# Patient Record
Sex: MALE | Race: WHITE | NOT HISPANIC OR LATINO | URBAN - METROPOLITAN AREA
[De-identification: names, ages, dates, MRNs, and addresses within clinical notes are randomized per-mention and may not be internally consistent; named-entity substitution may affect disease eponyms.]

---

## 2017-03-29 ENCOUNTER — APPOINTMENT (RX ONLY)
Dept: URBAN - METROPOLITAN AREA CLINIC 23 | Facility: CLINIC | Age: 57
Setting detail: DERMATOLOGY
End: 2017-03-29

## 2017-03-29 DIAGNOSIS — L91.8 OTHER HYPERTROPHIC DISORDERS OF THE SKIN: ICD-10-CM

## 2017-03-29 PROBLEM — E78.5 HYPERLIPIDEMIA, UNSPECIFIED: Status: ACTIVE | Noted: 2017-03-29

## 2017-03-29 PROBLEM — D48.5 NEOPLASM OF UNCERTAIN BEHAVIOR OF SKIN: Status: ACTIVE | Noted: 2017-03-29

## 2017-03-29 PROCEDURE — 11301 SHAVE SKIN LESION 0.6-1.0 CM: CPT | Mod: 59

## 2017-03-29 PROCEDURE — ? COUNSELING

## 2017-03-29 PROCEDURE — 11200 RMVL SKIN TAGS UP TO&INC 15: CPT

## 2017-03-29 PROCEDURE — 11201 RMVL SKIN TAGS EA ADDL 10: CPT

## 2017-03-29 PROCEDURE — ? SHAVE REMOVAL

## 2017-03-29 PROCEDURE — ? SKIN TAG REMOVAL

## 2017-03-29 ASSESSMENT — LOCATION DETAILED DESCRIPTION DERM
LOCATION DETAILED: RIGHT ANTERIOR MEDIAL PROXIMAL UPPER ARM
LOCATION DETAILED: LEFT AXILLARY VAULT
LOCATION DETAILED: RIGHT AXILLARY VAULT
LOCATION DETAILED: LEFT ANTERIOR MEDIAL PROXIMAL UPPER ARM
LOCATION DETAILED: RIGHT ANTERIOR PROXIMAL THIGH
LOCATION DETAILED: LEFT POSTERIOR AXILLA
LOCATION DETAILED: RIGHT ANTERIOR AXILLA

## 2017-03-29 ASSESSMENT — LOCATION SIMPLE DESCRIPTION DERM
LOCATION SIMPLE: LEFT UPPER ARM
LOCATION SIMPLE: RIGHT ANTERIOR AXILLA
LOCATION SIMPLE: LEFT AXILLARY VAULT
LOCATION SIMPLE: RIGHT AXILLARY VAULT
LOCATION SIMPLE: LEFT POSTERIOR AXILLA
LOCATION SIMPLE: RIGHT UPPER ARM
LOCATION SIMPLE: RIGHT THIGH

## 2017-03-29 ASSESSMENT — LOCATION ZONE DERM
LOCATION ZONE: AXILLAE
LOCATION ZONE: LEG
LOCATION ZONE: ARM

## 2017-03-29 NOTE — PROCEDURE: SKIN TAG REMOVAL
Include Z78.9 (Other Specified Conditions Influencing Health Status) As An Associated Diagnosis?: No
Detail Level: Detailed
Consent: Written consent obtained and the risks of skin tag removal was reviewed with the patient including but not limited to bleeding, pigmentary change, infection, pain, and remote possibility of scarring.
Anesthesia Volume In Cc: 1
Medical Necessity Clause: This procedure was medically necessary because the lesions that were treated were:rubbed by patient, deodorant
Medical Necessity Information: It is in your best interest to select a reason for this procedure from the list below. All of these items fulfill various CMS LCD requirements except the new and changing color options.
Hemostasis: Aluminum Chloride

## 2017-03-29 NOTE — PROCEDURE: SHAVE REMOVAL
Medical Necessity Information: It is in your best interest to select a reason for this procedure from the list below. All of these items fulfill various CMS LCD requirements except the new and changing color options.
Wound Care: Vaseline
Bill 74593 For Specimen Handling/Conveyance To Laboratory?: no
Size Of Lesion In Cm (Required): 0.9
Anesthesia Volume In Cc: 0.3
Hemostasis: Aluminum Chloride
Notification Instructions: Patient will be notified of biopsy results. However, patient instructed to call the office if not contacted within 2 weeks.
Billing Type: Third-Party Bill
Consent was obtained from the patient. The risks and benefits to therapy were discussed in detail. Specifically, the risks of infection, scarring, bleeding, prolonged wound healing, incomplete removal, allergy to anesthesia, nerve injury and recurrence were addressed. Prior to the procedure, the treatment site was clearly identified and confirmed by the patient. All components of Universal Protocol/PAUSE Rule completed.
Size Of Margin In Cm (Margins Are Not Added To Billing Dimensions): -
Detail Level: Detailed
Accession #: [id number]
X Size Of Lesion In Cm (Optional): 0
Path Notes (To The Dermatopathologist): Please check margins.
Biopsy Method: Dermablade
Post-Care Instructions: I reviewed with the patient in detail post-care instructions. Patient is to keep the biopsy site dry overnight, and then apply Vaseline and bandaid daily until healed. Patient may apply diluted hydrogen peroxide soaks to remove any crusting.
Medical Necessity Clause: This procedure was medically necessary because the lesion that was treated was:
Anesthesia Type: 1% lidocaine with epinephrine

## 2018-02-12 ENCOUNTER — HOSPITAL ENCOUNTER (OUTPATIENT)
Dept: MAMMOGRAPHY | Age: 58
Discharge: HOME | End: 2018-02-12
Attending: INTERNAL MEDICINE
Payer: COMMERCIAL

## 2018-02-12 DIAGNOSIS — N64.4 MASTODYNIA: ICD-10-CM

## 2018-02-12 PROCEDURE — 77066 DX MAMMO INCL CAD BI: CPT

## 2018-02-19 ENCOUNTER — APPOINTMENT (RX ONLY)
Dept: URBAN - METROPOLITAN AREA CLINIC 349 | Facility: CLINIC | Age: 58
Setting detail: DERMATOLOGY
End: 2018-02-19

## 2018-02-19 DIAGNOSIS — I87.2 VENOUS INSUFFICIENCY (CHRONIC) (PERIPHERAL): ICD-10-CM

## 2018-02-19 DIAGNOSIS — L82.0 INFLAMED SEBORRHEIC KERATOSIS: ICD-10-CM

## 2018-02-19 DIAGNOSIS — L82.1 OTHER SEBORRHEIC KERATOSIS: ICD-10-CM

## 2018-02-19 DIAGNOSIS — L81.4 OTHER MELANIN HYPERPIGMENTATION: ICD-10-CM

## 2018-02-19 DIAGNOSIS — D22 MELANOCYTIC NEVI: ICD-10-CM

## 2018-02-19 PROBLEM — I10 ESSENTIAL (PRIMARY) HYPERTENSION: Status: ACTIVE | Noted: 2018-02-19

## 2018-02-19 PROBLEM — I83.12 VARICOSE VEINS OF LEFT LOWER EXTREMITY WITH INFLAMMATION: Status: ACTIVE | Noted: 2018-02-19

## 2018-02-19 PROBLEM — D22.5 MELANOCYTIC NEVI OF TRUNK: Status: ACTIVE | Noted: 2018-02-19

## 2018-02-19 PROBLEM — I83.11 VARICOSE VEINS OF RIGHT LOWER EXTREMITY WITH INFLAMMATION: Status: ACTIVE | Noted: 2018-02-19

## 2018-02-19 PROBLEM — D22.39 MELANOCYTIC NEVI OF OTHER PARTS OF FACE: Status: ACTIVE | Noted: 2018-02-19

## 2018-02-19 PROBLEM — D23.61 OTHER BENIGN NEOPLASM OF SKIN OF RIGHT UPPER LIMB, INCLUDING SHOULDER: Status: ACTIVE | Noted: 2018-02-19

## 2018-02-19 PROBLEM — D22.62 MELANOCYTIC NEVI OF LEFT UPPER LIMB, INCLUDING SHOULDER: Status: ACTIVE | Noted: 2018-02-19

## 2018-02-19 PROCEDURE — ? LIQUID NITROGEN

## 2018-02-19 PROCEDURE — 99202 OFFICE O/P NEW SF 15 MIN: CPT | Mod: 25

## 2018-02-19 PROCEDURE — ? COUNSELING

## 2018-02-19 PROCEDURE — ? OTHER

## 2018-02-19 PROCEDURE — 17110 DESTRUCTION B9 LES UP TO 14: CPT

## 2018-02-19 ASSESSMENT — LOCATION DETAILED DESCRIPTION DERM
LOCATION DETAILED: RIGHT CLAVICULAR SKIN
LOCATION DETAILED: LEFT POSTERIOR SHOULDER
LOCATION DETAILED: RIGHT INFERIOR CENTRAL MALAR CHEEK
LOCATION DETAILED: LEFT CENTRAL MALAR CHEEK
LOCATION DETAILED: RIGHT DISTAL PRETIBIAL REGION
LOCATION DETAILED: LEFT INFERIOR MEDIAL MALAR CHEEK
LOCATION DETAILED: RIGHT PROXIMAL PRETIBIAL REGION
LOCATION DETAILED: RIGHT CENTRAL MALAR CHEEK
LOCATION DETAILED: RIGHT MEDIAL SUPERIOR CHEST
LOCATION DETAILED: RIGHT MEDIAL TRAPEZIAL NECK
LOCATION DETAILED: LEFT ANTERIOR SHOULDER
LOCATION DETAILED: LEFT CLAVICULAR SKIN
LOCATION DETAILED: LEFT SUPERIOR MEDIAL LOWER BACK
LOCATION DETAILED: LEFT DISTAL PRETIBIAL REGION
LOCATION DETAILED: STERNUM
LOCATION DETAILED: LEFT ANTERIOR PROXIMAL UPPER ARM
LOCATION DETAILED: RIGHT ANTERIOR PROXIMAL UPPER ARM
LOCATION DETAILED: LEFT SUPERIOR MEDIAL MIDBACK
LOCATION DETAILED: RIGHT SUPERIOR MEDIAL LOWER BACK
LOCATION DETAILED: LEFT PROXIMAL PRETIBIAL REGION
LOCATION DETAILED: LEFT ANTERIOR PROXIMAL THIGH
LOCATION DETAILED: LEFT MID-UPPER BACK
LOCATION DETAILED: RIGHT MEDIAL UPPER BACK
LOCATION DETAILED: EPIGASTRIC SKIN
LOCATION DETAILED: LEFT DISTAL DORSAL FOREARM

## 2018-02-19 ASSESSMENT — LOCATION ZONE DERM
LOCATION ZONE: TRUNK
LOCATION ZONE: NECK
LOCATION ZONE: ARM
LOCATION ZONE: FACE
LOCATION ZONE: LEG

## 2018-02-19 ASSESSMENT — LOCATION SIMPLE DESCRIPTION DERM
LOCATION SIMPLE: RIGHT UPPER ARM
LOCATION SIMPLE: LEFT UPPER ARM
LOCATION SIMPLE: LEFT LOWER BACK
LOCATION SIMPLE: LEFT FOREARM
LOCATION SIMPLE: POSTERIOR NECK
LOCATION SIMPLE: LEFT CLAVICULAR SKIN
LOCATION SIMPLE: RIGHT LOWER BACK
LOCATION SIMPLE: RIGHT PRETIBIAL REGION
LOCATION SIMPLE: LEFT THIGH
LOCATION SIMPLE: LEFT SHOULDER
LOCATION SIMPLE: RIGHT CLAVICULAR SKIN
LOCATION SIMPLE: LEFT PRETIBIAL REGION
LOCATION SIMPLE: RIGHT UPPER BACK
LOCATION SIMPLE: CHEST
LOCATION SIMPLE: LEFT CHEEK
LOCATION SIMPLE: LEFT UPPER BACK
LOCATION SIMPLE: RIGHT CHEEK
LOCATION SIMPLE: ABDOMEN

## 2018-02-19 NOTE — PROCEDURE: OTHER
Other (Free Text): Patient will continue to follow up with primary care physician
Detail Level: Zone
Note Text (......Xxx Chief Complaint.): This diagnosis correlates with the

## 2018-02-19 NOTE — PROCEDURE: LIQUID NITROGEN
Include Z78.9 (Other Specified Conditions Influencing Health Status) As An Associated Diagnosis?: Yes
Post-Care Instructions: I reviewed with the patient in detail post-care instructions. Patient is to wear sunprotection, and avoid picking at any of the treated lesions. Pt may apply Vaseline to crusted or scabbing areas.
Consent: The patient's consent was obtained including but not limited to risks of crusting, scabbing, blistering, scarring, darker or lighter pigmentary change, recurrence, incomplete removal and infection.
Medical Necessity Clause: This procedure was medically necessary because the lesions that were treated were:
Detail Level: Detailed
Add 52 Modifier (Optional): no
Number Of Freeze-Thaw Cycles: 2 freeze-thaw cycles
Medical Necessity Information: It is in your best interest to select a reason for this procedure from the list below. All of these items fulfill various CMS LCD requirements except the new and changing color options.

## 2019-02-20 ENCOUNTER — APPOINTMENT (RX ONLY)
Dept: URBAN - METROPOLITAN AREA CLINIC 349 | Facility: CLINIC | Age: 59
Setting detail: DERMATOLOGY
End: 2019-02-20

## 2022-08-23 ENCOUNTER — OFFICE VISIT (OUTPATIENT)
Dept: ORTHOPEDIC SURGERY | Age: 62
End: 2022-08-23
Payer: COMMERCIAL

## 2022-08-23 VITALS — HEIGHT: 72 IN | BODY MASS INDEX: 34 KG/M2 | WEIGHT: 251 LBS

## 2022-08-23 DIAGNOSIS — M54.50 LOW BACK PAIN, UNSPECIFIED BACK PAIN LATERALITY, UNSPECIFIED CHRONICITY, UNSPECIFIED WHETHER SCIATICA PRESENT: Primary | ICD-10-CM

## 2022-08-23 DIAGNOSIS — M51.36 DDD (DEGENERATIVE DISC DISEASE), LUMBAR: ICD-10-CM

## 2022-08-23 DIAGNOSIS — M48.062 SPINAL STENOSIS OF LUMBAR REGION WITH NEUROGENIC CLAUDICATION: ICD-10-CM

## 2022-08-23 PROCEDURE — 99204 OFFICE O/P NEW MOD 45 MIN: CPT | Performed by: ORTHOPAEDIC SURGERY

## 2022-08-23 PROCEDURE — 64483 NJX AA&/STRD TFRM EPI L/S 1: CPT | Performed by: PHYSICAL MEDICINE & REHABILITATION

## 2022-08-23 PROCEDURE — 64484 NJX AA&/STRD TFRM EPI L/S EA: CPT | Performed by: PHYSICAL MEDICINE & REHABILITATION

## 2022-08-23 RX ORDER — HYDROCODONE BITARTRATE AND ACETAMINOPHEN 7.5; 325 MG/1; MG/1
1 TABLET ORAL EVERY 6 HOURS PRN
Qty: 28 TABLET | Refills: 0 | Status: SHIPPED | OUTPATIENT
Start: 2022-08-23 | End: 2022-08-30

## 2022-08-23 RX ORDER — CLONIDINE HYDROCHLORIDE 0.3 MG/1
TABLET ORAL
COMMUNITY
Start: 2022-07-18

## 2022-08-23 RX ORDER — TRIAMCINOLONE ACETONIDE 40 MG/ML
80 INJECTION, SUSPENSION INTRA-ARTICULAR; INTRAMUSCULAR ONCE
Status: COMPLETED | OUTPATIENT
Start: 2022-08-23 | End: 2022-08-23

## 2022-08-23 RX ORDER — DAPAGLIFLOZIN 10 MG/1
TABLET, FILM COATED ORAL
COMMUNITY
Start: 2022-07-28

## 2022-08-23 RX ORDER — LISINOPRIL 40 MG/1
TABLET ORAL
COMMUNITY
Start: 2022-06-05

## 2022-08-23 RX ORDER — PREDNISONE 10 MG/1
TABLET ORAL
Qty: 48 TABLET | Refills: 0 | Status: SHIPPED | OUTPATIENT
Start: 2022-08-23

## 2022-08-23 RX ORDER — FENOFIBRATE 54 MG/1
TABLET ORAL
COMMUNITY
Start: 2022-06-19

## 2022-08-23 RX ORDER — HYDRALAZINE HYDROCHLORIDE 50 MG/1
TABLET, FILM COATED ORAL
COMMUNITY
Start: 2022-07-18

## 2022-08-23 RX ORDER — METOPROLOL SUCCINATE 100 MG/1
TABLET, EXTENDED RELEASE ORAL
COMMUNITY
Start: 2022-07-22

## 2022-08-23 RX ADMIN — TRIAMCINOLONE ACETONIDE 80 MG: 40 INJECTION, SUSPENSION INTRA-ARTICULAR; INTRAMUSCULAR at 14:42

## 2022-08-23 NOTE — LETTER
Tori Graham  1960  62 y.o.  [id number]    Diagnosis Code:                              RT                  LT                  BILAT    DDDZ   LBP   L/S NEURITIS   L SPRAIN   SPONDY   L STENOSIS   L DISC   POST MEEHAN   CRONIC PAIN    SCOLI   LIMB PAIN   TH PAIN   SI JOINT   C DDDZ   C STENOSIS   C DISC   BRACH NEUR   NECK PAIN    CTS   COCCYX   OSTEO   COMP FX   HIP BURS   POST FUS   POST NON   [address] PAIN   ARM PAIN    OTHER____________________________________________________________      Radiographic Studies:    CERV/ THORACIC/ LUMBAR MRI       WITH/  W/O  Contrast              _____________________Discogram    CT /Myelogram of _______________                  NCS/EMG  Upper / Lower Extremity________________    MRI of _______________________                     Other______________________    Injections:     ________________________ESI/ SNRB/ FACET                     _______________________Rhizotomies     Authorization to stop blood thinners: _____________________________________________________      Medications:    __________________Sterpred DS                                    ___________________Gabapentin QD/ BID/ TID    __________________Norco/Tramadol   QD / BID / TID    ____________________NSAIDS  QD / BID / TID    __________________Flexeril QD/ BID/ TID                           ____________________Other      Visit Charges:    Recheck 2           Recheck 3               Recheck 4              Recheck 5      INJ ______________________    New PT 2            New PT 3                New PT 4               New PT 5          Pre-op / Post-op      Physical Therapy:    Lumbar  /  Cervical                     ___________________________ (Traction / Ultrasound, Dry Needling)       Referral: Ollis Balsam /  [facility]   /OTHER: __________________________________________      Follow-up with SEL______________________________________________________________      Work Note: _____________________________________________________________________

## 2022-08-23 NOTE — PROGRESS NOTES
Name: Letha Mendoza  YOB: 1960  Gender: male  MRN: [id number]    DATE: 8/23/2022    CC: Lower Back Pain       HPI this is a new visit on patient Cresencio De Leon who is a 82-year-old male complaining of low back and left leg pain that he has had for about 4 weeks that occurred after bending and lifting something. He states there is been no improvement of his pain over time he states the low back pain radiates around into his anterior thigh and goes down his posterior calf. He denies any right leg pain. He does get numbness and tingling in the left dorsal foot area and he says his left foot and ankle are weak make it difficult to walk and is using a walker. He has had 2 prior L5-S1 left-sided disc surgeries in the past and he said he got better after each. Currently the pain is constant and it is made worse by laying down or standing and seems to be improved by sitting. Treatment has consisted of chiropractic care, heat and ice, IM steroid injection and a lot of Advil. His medical history is significant for being a non-insulin-dependent diabetic. He does not have any heart lung liver kidney disease and he is not on blood thinners. RADIOGRAPHS: AP and lateral lumbar x-rays done today 8/23/2022 show a severely degenerated L5-S1 disc otherwise the rest of his spine is relatively normal in appearance although it is hyperlordotic. Lumbar MRI scan dated 8/17/2022 shows severe L5-S1 disc degeneration with left paracentral disc herniation and significant left-sided foraminal stenosis. In addition he has spinal stenosis at L4-5 that is worse than the stenosis at L3-4. At L2-3 level he has a right-sided herniated disc with extruded treated fragment that goes up the back of the L2 body but he has no right leg pain. PE: He is 6 feet 0 inches tall and weighs 251 pounds. He is normal in appearance but obese and is alert and oriented 3 but appears to be in significant pain.   He uses a walker to ambulate. Straight leg raising test and clonus are negative bilaterally. Motor testing shows that he has weakness with left hip flexion and left ankle dorsiflexion. He seems to have normal left knee extension and plantar flexion and muscle testing on the right side is normal throughout. He seems to have normal sensation throughout both lower extremities. Hip knee range of motion is normal without pain. He has good palpable ankle pulses bilaterally but he has notable edema in both shin and feet    CURRENT MEDS:     Current Outpatient Medications:     cloNIDine (CATAPRES) 0.3 MG tablet, TAKE 1 TABLET BY MOUTH TWICE DAILY FOR 90 DAYS, Disp: , Rfl:     FARXIGA 10 MG tablet, TAKE 1 TABLET BY MOUTH ONCE DAILY, Disp: , Rfl:     fenofibrate (TRICOR) 54 MG tablet, TAKE 1 TABLET BY MOUTH ONCE DAILY FOR 90 DAYS, Disp: , Rfl:     hydrALAZINE (APRESOLINE) 50 MG tablet, TAKE 1 TABLET BY MOUTH WITH FOOD THREE TIMES DAILY FOR 90 DAYS, Disp: , Rfl:     lisinopril (PRINIVIL;ZESTRIL) 40 MG tablet, TAKE 1 TABLET BY MOUTH ONCE DAILY, Disp: , Rfl:     metoprolol succinate (TOPROL XL) 100 MG extended release tablet, TAKE 1 TABLET BY MOUTH IN THE MORNING AND 2 IN THE EVENING, Disp: , Rfl:     sertraline (ZOLOFT) 50 MG tablet, TAKE 1 TABLET BY MOUTH ONCE DAILY, Disp: , Rfl:     predniSONE (DELTASONE) 10 MG tablet, Day 1-4: 2 tablets before breakfast, 1 after lunch, 1 after dinner, 2 tablets at bedtime. Day 5-8: 1 tablet before breakfast, 1 after lunch, 1 after dinner, 1 at bedtime. Day 9-12: 1 tablet before breakfast and 1 at bedtime. , Disp: 48 tablet, Rfl: 0    HYDROcodone-acetaminophen (NORCO) 7.5-325 MG per tablet, Take 1 tablet by mouth every 6 hours as needed for Pain for up to 7 days. Intended supply: 7 days.  Take lowest dose possible to manage pain, Disp: 28 tablet, Rfl: 0    POTASSIUM CHLORIDE PO, Take 10 mg by mouth, Disp: , Rfl:     amLODIPine (NORVASC) 10 MG tablet, Take 10 mg by mouth, Disp: , Rfl:     atorvastatin (LIPITOR) 40 MG tablet, Take 40 mg by mouth, Disp: , Rfl:     vitamin D (CHOLECALCIFEROL) 11212 UNIT CAPS, Take by mouth, Disp: , Rfl:     furosemide (LASIX) 20 MG tablet, Take 20 mg by mouth, Disp: , Rfl:     metoprolol tartrate (LOPRESSOR) 25 MG tablet, Take 25 mg by mouth, Disp: , Rfl:    No Known Allergies    ASSESSMENT/PLAN: I talked with the patient and his wife and showed him where I thought the problem was which really think is at L5-S1 and has the stenosis he has at other levels looks longstanding and would not be acute onset after bending lifting injury like a disc herniation. In addition he has the weakness with dorsiflexion which I believe is the L5 nerve root. I talked to the patient his wife and discussed that he is now having a third time disc herniation at L5-S1 with a very collapsed disc and significant foraminal stenosis on the left and I do not think I could reliably just do a laminectomy and open the foramen. I think to really get a good decompression of the L5 foramen he would need an anterior interbody fusion to distract the disc space. He would also need a posterior laminectomy decompression followed by an instrumented fusion tolerating this AP fusion significant surgery and since he does not do manual labor I think he would get a work for the usual 3 months. 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  I told him that we could do left-sided L5 and S1 selective nerve root blocks which hopefully would help him and he is going on a trip this weekend for business someone to give him some Norco 7.5 mg for pain I am also give him a Sterapred Dosepak. We will try to line up the selective nerve root blocks before he leaves at least the first 1 if possible.   Depending on the success of the injections he could either get all 3A or I could see him back after the first would not talk more about surgery told him it would be several for up to 7 days. Intended supply: 7 days. Take lowest dose possible to manage pain             No follow-up provider specified. Electronically signed by Barbie Alcazar MD        Elements of this note were created using speech recognition software. As such, errors of speech recognition may be present.

## 2022-08-23 NOTE — PROGRESS NOTES
Name: Madeleine Daly  YOB: 1960  Gender: male  MRN: [id number]        Transforaminal OPAL Procedure Note    Procedure: Left  L5-S1 and S1-S2 transforaminal epidural steroid injections     Precautions: Madeleine Daly denies prior sensitivity to steroid, local anesthetic, iodine, or shellfish. Consent:  Consent was obtained prior to the procedure. The procedure was discussed at length with Madeleine Daly. He was given the opportunity to ask questions regarding the procedure and its associated risks. In addition to the potential risks associated with the procedure itself, the patient was informed both verbally and in writing of potential side effects of the use glucocorticoids. The patient appeared to comprehend the informed consent and desired to have the procedure performed, and informed consent was signed. He was placed in a prone position on the fluoroscopy table and the skin was prepped and draped in a routine sterile fashion. The areas to be injected were each anesthetized with 1 ml of 1% Lidocaine. A 22 gauge 3.5 inch spinal needle was carefully advanced under fluoroscopic guidance to the left L5-S1 transforaminal space  0.5 ml of 70% of Omnipaque was injected to confirm proper needle placement and absence of subdural or vascular flow Once proper placement was confirmed, 0.5ml of 0.25 marcaine and 60 mg of kenalog were injected through the spinal needle. The above procedure was then repeated at the Left  S1-S2 transforaminal space using 60 mg of kenalog. Fluoroscopic guidance was used intermittently over a 10-minute period to insure proper needle placement and his safety. A hard copy of the fluoroscopic image has been placed in his chart and is saved on the C-arm hard drive. He was monitored for 30 minutes after the procedure and discharged home in a stable fashion with a routine follow up.     Procedural Diagnosis:     ICD-10-CM    1. Low back pain, unspecified back pain laterality, unspecified chronicity, unspecified whether sciatica present  M54.50 FL NERVE BLOCK LUMBOSACRAL 1ST     triamcinolone acetonide (KENALOG-40) injection 80 mg      2. Spinal stenosis of lumbar region with neurogenic claudication  M48.062 FL NERVE BLOCK LUMBOSACRAL 1ST     triamcinolone acetonide (KENALOG-40) injection 80 mg      3.  DDD (degenerative disc disease), lumbar  M51.36 FL NERVE BLOCK LUMBOSACRAL 1ST     triamcinolone acetonide (KENALOG-40) injection 80 mg         Evans Pollack MD  08/23/22 no

## 2022-10-03 ENCOUNTER — OFFICE VISIT (OUTPATIENT)
Dept: UROLOGY | Age: 62
End: 2022-10-03
Payer: COMMERCIAL

## 2022-10-03 DIAGNOSIS — R97.20 PSA ELEVATION: Primary | ICD-10-CM

## 2022-10-03 LAB
BILIRUBIN, URINE, POC: NEGATIVE
BLOOD URINE, POC: NEGATIVE
GLUCOSE URINE, POC: 500
KETONES, URINE, POC: NEGATIVE
LEUKOCYTE ESTERASE, URINE, POC: NEGATIVE
NITRITE, URINE, POC: NEGATIVE
PH, URINE, POC: 6 (ref 4.6–8)
PROTEIN,URINE, POC: 300
SPECIFIC GRAVITY, URINE, POC: 1.03 (ref 1–1.03)
URINALYSIS CLARITY, POC: ABNORMAL
URINALYSIS COLOR, POC: ABNORMAL
UROBILINOGEN, POC: NORMAL

## 2022-10-03 PROCEDURE — 81002 URINALYSIS NONAUTO W/O SCOPE: CPT | Performed by: UROLOGY

## 2022-10-03 PROCEDURE — 99243 OFF/OP CNSLTJ NEW/EST LOW 30: CPT | Performed by: UROLOGY

## 2022-10-03 ASSESSMENT — ENCOUNTER SYMPTOMS
COUGH: 1
EYES NEGATIVE: 1
BACK PAIN: 1

## 2022-10-03 NOTE — PROGRESS NOTES
[facility]  [address]  [address] [facility], [address] [facility]  [phone]    Katia Dash  : 1960    Chief Complaint   Patient presents with    New Patient    Elevated PSA        HPI     Katia Dash is a 63 y.o. male Referred by Dr. Ronaldo Barboza for evaluation and treatment of elevated PSA. PSA 11. 1. he gets the labs done every 3 months. He states that the PSA was around 2 earlier in . Pt's maternal grandfather  of prostate cancer. Walks with a cane because of back problems. Is going to have a spinal fusion. Past Medical History:   Diagnosis Date    Diabetes mellitus ([address]     Elevated PSA 2022    Hypertension      Past Surgical History:   Procedure Laterality Date    BLADDER SURGERY       Current Outpatient Medications   Medication Sig Dispense Refill    cloNIDine (CATAPRES) 0.3 MG tablet TAKE 1 TABLET BY MOUTH TWICE DAILY FOR 90 DAYS      FARXIGA 10 MG tablet TAKE 1 TABLET BY MOUTH ONCE DAILY      fenofibrate (TRICOR) 54 MG tablet TAKE 1 TABLET BY MOUTH ONCE DAILY FOR 90 DAYS      hydrALAZINE (APRESOLINE) 50 MG tablet TAKE 1 TABLET BY MOUTH WITH FOOD THREE TIMES DAILY FOR 90 DAYS      lisinopril (PRINIVIL;ZESTRIL) 40 MG tablet TAKE 1 TABLET BY MOUTH ONCE DAILY      metoprolol succinate (TOPROL XL) 100 MG extended release tablet TAKE 1 TABLET BY MOUTH IN THE MORNING AND 2 IN THE EVENING      sertraline (ZOLOFT) 50 MG tablet TAKE 1 TABLET BY MOUTH ONCE DAILY      predniSONE (DELTASONE) 10 MG tablet Day 1-4: 2 tablets before breakfast, 1 after lunch, 1 after dinner, 2 tablets at bedtime. Day 5-8: 1 tablet before breakfast, 1 after lunch, 1 after dinner, 1 at bedtime. Day 9-12: 1 tablet before breakfast and 1 at bedtime.  48 tablet 0    POTASSIUM CHLORIDE PO Take 10 mg by mouth      amLODIPine (NORVASC) 10 MG tablet Take 10 mg by mouth      atorvastatin (LIPITOR) 40 MG tablet Take 40 mg by mouth      vitamin D (CHOLECALCIFEROL) 14004 UNIT CAPS Take by mouth furosemide (LASIX) 20 MG tablet Take 20 mg by mouth      metoprolol tartrate (LOPRESSOR) 25 MG tablet Take 25 mg by mouth       No current facility-administered medications for this visit. No Known Allergies  Social History     Socioeconomic History    Marital status:      Spouse name: Not on file    Number of children: Not on file    Years of education: Not on file    Highest education level: Not on file   Occupational History    Not on file   Tobacco Use    Smoking status: Some Days     Types: Cigars    Smokeless tobacco: Never    Tobacco comments:     cigars   Substance and Sexual Activity    Alcohol use: Yes     Alcohol/week: 2.0 standard drinks     Types: 1 Glasses of wine, 1 Cans of beer per week    Drug use: Not Currently    Sexual activity: Yes     Partners: Female   Other Topics Concern    Not on file   Social History Narrative    Not on file     Social Determinants of Health     Financial Resource Strain: Not on file   Food Insecurity: Not on file   Transportation Needs: Not on file   Physical Activity: Not on file   Stress: Not on file   Social Connections: Not on file   Intimate Partner Violence: Not on file   Housing Stability: Not on file     Family History   Problem Relation Age of Onset    Hypertension Father             Prostate Cancer Paternal Grandfather                Review of Systems  Constitutional: Negative  Skin: Negative skin ROS  Eyes: Eyes negative  ENT: HENT negative  Respiratory: Positive for cough. Cardiovascular: Positive for hypertension, leg swelling and regular rate and rhythm. GI: Neg GI ROS  Genitourinary: Genitourinary negative  Musculoskeletal: Positive for back pain. Neurological: Neg neuro ROS  Psychological: Neg psych ROS  Endocrine: Endocrine negative  Hem/Lymphatic: Hematologic/lymphatic negative    There were no vitals taken for this visit. Urinalysis  UA - Dipstick  No results found for this or any previous visit.     UA - Micro  WBC - 0  RBC - 0  Bacteria - 0  Epith - 0    Physical Exam    Assessment and Plan    Oleta Severs was seen today for new patient and elevated psa. Diagnoses and all orders for this visit:    PSA elevation  -     AMB POC URINALYSIS DIP STICK MANUAL W/O MICRO  -     PSA, Total and Free; Future  -     PSA, Total and Free   Will recheck PSA II. Was going to see him back in room, but he left . Call patient with results      Follow-up and Dispositions    Return for call patient to arrange follow-up.

## 2022-10-04 LAB
PSA FREE MFR SERPL: 13.2 %
PSA FREE SERPL-MCNC: 1.7 NG/ML
PSA SERPL-MCNC: 12.9 NG/ML

## 2022-10-05 ENCOUNTER — TELEPHONE (OUTPATIENT)
Dept: UROLOGY | Age: 62
End: 2022-10-05

## 2022-10-05 DIAGNOSIS — R97.20 PSA ELEVATION: Primary | ICD-10-CM

## 2022-10-05 NOTE — TELEPHONE ENCOUNTER
Called pt, repeat PSA is 12. Recommended MRI    Diagnosis Orders   1. PSA elevation  MRI PELVIS W WO CONTRAST        Call patient with results  May need MRI fusion biopsy.

## 2022-10-17 ENCOUNTER — HOSPITAL ENCOUNTER (OUTPATIENT)
Dept: MRI IMAGING | Age: 62
Discharge: HOME | End: 2022-10-20
Payer: COMMERCIAL

## 2022-10-17 DIAGNOSIS — R97.20 PSA ELEVATION: ICD-10-CM

## 2022-10-17 PROCEDURE — 2580000003 HC RX 258: Performed by: UROLOGY

## 2022-10-17 PROCEDURE — A9579 GAD-BASE MR CONTRAST NOS,1ML: HCPCS | Performed by: UROLOGY

## 2022-10-17 PROCEDURE — 6360000004 HC RX CONTRAST MEDICATION: Performed by: UROLOGY

## 2022-10-17 PROCEDURE — 72197 MRI PELVIS W/O & W/DYE: CPT

## 2022-10-17 RX ORDER — SODIUM CHLORIDE 0.9 % (FLUSH) 0.9 %
40 SYRINGE (ML) INJECTION AS NEEDED
Status: DISCONTINUED | OUTPATIENT
Start: 2022-10-17 | End: 2022-10-21 | Stop reason: HOSPADM

## 2022-10-17 RX ADMIN — SODIUM CHLORIDE, PRESERVATIVE FREE 40 ML: 5 INJECTION INTRAVENOUS at 21:30

## 2022-10-17 RX ADMIN — GADOTERIDOL 24 ML: 279.3 INJECTION, SOLUTION INTRAVENOUS at 21:30

## 2022-11-04 ENCOUNTER — TELEPHONE (OUTPATIENT)
Dept: UROLOGY | Age: 62
End: 2022-11-04

## 2022-11-04 NOTE — TELEPHONE ENCOUNTER
Called pt about MRI, shows PIRADS 3 area, possible prostatitis. He has just started a Zpack for URI. Discussed options of repeating pSA after abx, or proceeding to biopsy. Make appt with me in 6 weeks with PSA before. Will do GAEL then.

## 2023-02-04 ENCOUNTER — TELEPHONE (OUTPATIENT)
Dept: UROLOGY | Age: 63
End: 2023-02-04

## 2023-02-04 NOTE — TELEPHONE ENCOUNTER
Called to schedule f/u appt with PSA prior. Reached pt's voicemail. LVM for pt to call and make appts.

## 2023-03-22 ENCOUNTER — TELEPHONE (OUTPATIENT)
Dept: UROLOGY | Age: 63
End: 2023-03-22

## 2025-04-01 ENCOUNTER — OFFICE VISIT (OUTPATIENT)
Dept: UROLOGY | Age: 65
End: 2025-04-01
Payer: COMMERCIAL

## 2025-04-01 DIAGNOSIS — R97.20 ELEVATED PSA: Primary | ICD-10-CM

## 2025-04-01 LAB
BILIRUBIN, URINE, POC: NEGATIVE
BLOOD URINE, POC: NEGATIVE
GLUCOSE URINE, POC: 250 MG/DL
KETONES, URINE, POC: NEGATIVE MG/DL
LEUKOCYTE ESTERASE, URINE, POC: NEGATIVE
NITRITE, URINE, POC: NEGATIVE
PH, URINE, POC: 5.5 (ref 4.6–8)
PROTEIN,URINE, POC: 300 MG/DL
SPECIFIC GRAVITY, URINE, POC: 1.02 (ref 1–1.03)
URINALYSIS CLARITY, POC: NORMAL
URINALYSIS COLOR, POC: NORMAL
UROBILINOGEN, POC: NORMAL MG/DL

## 2025-04-01 PROCEDURE — 99203 OFFICE O/P NEW LOW 30 MIN: CPT | Performed by: NURSE PRACTITIONER

## 2025-04-01 PROCEDURE — 81003 URINALYSIS AUTO W/O SCOPE: CPT | Performed by: NURSE PRACTITIONER

## 2025-04-01 RX ORDER — SPIRONOLACTONE 25 MG/1
25 TABLET ORAL 2 TIMES DAILY
COMMUNITY

## 2025-04-01 RX ORDER — CHLORAL HYDRATE 500 MG
1 CAPSULE ORAL
COMMUNITY

## 2025-04-01 RX ORDER — LINACLOTIDE 290 UG/1
CAPSULE, GELATIN COATED ORAL
COMMUNITY

## 2025-04-01 RX ORDER — MELOXICAM 7.5 MG/1
7.5 TABLET ORAL DAILY
COMMUNITY

## 2025-04-01 RX ORDER — GLIMEPIRIDE 2 MG/1
2 TABLET ORAL 2 TIMES DAILY
COMMUNITY

## 2025-04-01 RX ORDER — LABETALOL 200 MG/1
200 TABLET, FILM COATED ORAL
COMMUNITY
Start: 2025-01-22

## 2025-04-01 RX ORDER — EMPAGLIFLOZIN 25 MG/1
25 TABLET, FILM COATED ORAL
COMMUNITY

## 2025-04-01 RX ORDER — SEMAGLUTIDE 0.68 MG/ML
0.5 INJECTION, SOLUTION SUBCUTANEOUS
COMMUNITY

## 2025-04-01 ASSESSMENT — ENCOUNTER SYMPTOMS
NAUSEA: 0
BACK PAIN: 0

## 2025-04-01 NOTE — PROGRESS NOTES
[facility]  [address] [place] [id number]  [phone]          Arvind Rodas  : 1960    Chief Complaint   Patient presents with    Follow-up          HPI     Arvind Rodas is a 64 y.o. male  Referred back for elevated PSA. He was seen Dr. Nixon in the past. Last visit was . At that time PSA was elevated to 11.1. A MRI was done and it showed one PI-RADS-3 lesion.  No other suspicious areas.     Pt's maternal grandfather  of prostate cancer.  Walks with a cane because of back problems. Is going to have a spinal fusion.     Current PSA is 14.6  PSA  was 8.6  PSA  was 11.1  PSA  was 12.9    Pt has not complaint today. He is voiding well.     Past Medical History:   Diagnosis Date    Diabetes mellitus (HCC)     Elevated PSA 2022    Hypertension      Past Surgical History:   Procedure Laterality Date    BLADDER SURGERY       Current Outpatient Medications   Medication Sig Dispense Refill    labetalol (NORMODYNE) 200 MG tablet 1 tablet      Semaglutide,0.25 or 0.5MG/DOS, (OZEMPIC, 0.25 OR 0.5 MG/DOSE,) 2 MG/3ML SOPN Inject 0.5 mg into the skin every 7 days      JARDIANCE 25 MG tablet 1 tablet      glimepiride (AMARYL) 2 MG tablet Take 1 tablet by mouth 2 times daily      linaclotide (LINZESS) 290 MCG CAPS capsule 90      Omega-3 Fatty Acids (FISH OIL) 1000 MG capsule 1 capsule      spironolactone (ALDACTONE) 25 MG tablet Take 1 tablet by mouth 2 times daily      meloxicam (MOBIC) 7.5 MG tablet Take 1 tablet by mouth daily      cloNIDine (CATAPRES) 0.3 MG tablet TAKE 1 TABLET BY MOUTH TWICE DAILY FOR 90 DAYS      fenofibrate (TRICOR) 54 MG tablet TAKE 1 TABLET BY MOUTH ONCE DAILY FOR 90 DAYS      hydrALAZINE (APRESOLINE) 50 MG tablet TAKE 1 TABLET BY MOUTH WITH FOOD THREE TIMES DAILY FOR 90 DAYS      metoprolol succinate (TOPROL XL) 100 MG extended release tablet TAKE 1 TABLET BY MOUTH IN THE MORNING AND 2 IN THE EVENING      amLODIPine (NORVASC) 10 MG

## 2025-04-08 ENCOUNTER — HOSPITAL ENCOUNTER (OUTPATIENT)
Age: 65
Discharge: HOME | End: 2025-04-10
Payer: COMMERCIAL

## 2025-04-08 DIAGNOSIS — R97.20 ELEVATED PSA: ICD-10-CM

## 2025-04-08 PROCEDURE — 6360000004 HC RX CONTRAST MEDICATION: Performed by: NURSE PRACTITIONER

## 2025-04-08 PROCEDURE — A9579 GAD-BASE MR CONTRAST NOS,1ML: HCPCS | Performed by: NURSE PRACTITIONER

## 2025-04-08 PROCEDURE — 72197 MRI PELVIS W/O & W/DYE: CPT

## 2025-04-08 RX ADMIN — GADOTERIDOL 25 ML: 279.3 INJECTION, SOLUTION INTRAVENOUS at 08:55

## 2025-04-11 ENCOUNTER — RESULTS FOLLOW-UP (OUTPATIENT)
Dept: UROLOGY | Age: 65
End: 2025-04-11

## 2025-04-11 DIAGNOSIS — R97.20 ELEVATED PSA: ICD-10-CM

## 2025-04-11 RX ORDER — CIPROFLOXACIN 500 MG/1
TABLET, FILM COATED ORAL
Qty: 6 TABLET | Refills: 0 | Status: SHIPPED | OUTPATIENT
Start: 2025-04-11

## 2025-04-14 ENCOUNTER — TELEPHONE (OUTPATIENT)
Dept: UROLOGY | Age: 65
End: 2025-04-14

## 2025-04-15 NOTE — TELEPHONE ENCOUNTER
I spoke with pt.  I told him our next available date of 5/13.  I just need confirmation from [facility] that they are available that afternoon.  I have emailed them to confirm before scheduling.

## 2025-04-15 NOTE — TELEPHONE ENCOUNTER
----- Message from TON Rosas NP sent at 4/11/2025 12:02 PM EDT -----  I called pt about MRI. He will need MRI guided fusion bx.  He will start antibiotic the day before and take it for 3 days.      Please call pt to arrange bx with Dr. Nixon.

## 2025-04-16 PROBLEM — R97.20 ELEVATED PSA: Status: ACTIVE | Noted: 2025-04-11

## 2025-04-16 NOTE — TELEPHONE ENCOUNTER
Can you put in the surgery orders for this one?  Thanks    PROSTATE BIOPSY FUSION  Date: 5/13/2025  Time: 1305  Location: [facility] OP OR 05

## 2025-04-29 NOTE — PERIOP NOTE
Patient verified name and .  Order for consent  was found in EHR and does match case posting; patient verifies procedure.   Type 1a surgery, phone assessment complete.  Orders  received.  Labs per surgeon: UA, urine culture, CBC with diff s/h PAT- patient will come in for labs- chart flagged for charge RN  Labs per anesthesia protocol: SQBS, POC K+ s/h for DOS    Patient answered medical/surgical history questions at their best of ability. All prior to admission medications documented in EPIC.      Patient instructed on the following:  > labs- [address] [facility], [address], Monday-Friday 8 am- 3:30 pm  > Surgery Location: [address] [place] [address] Outpatient Entrance   > Time of arrival for surgery: A nurse will call you by 5:00 pm the business day before your surgery      to tell you the time you should arrive. Your arrival time may be different than your surgery time. If there is no      answer; the nurse will leave you a voicemail. If you have not received a phone call and do not have a voicemail     by 5:00 pm the day before your surgery please call \[facility]\"" Pre-op: [phone].    > Clear liquid diet the day prior to sugery,  patient may drink clear liquids up until 4 hours prior to arrival. No gum, candy, mints.   > Things included in a clear liquid diet:     Water  Black Coffee (may have sugar but no milk or cream)  Tea  Any type soft drink  Apple, Cranberry, or Grape juice  Chicken bouillon or broth  Beef bouillon or broth  Popsicles  Jell-O (any flavor), NO solid fruit mixed in  Hard candy that is clear, such as lifesavers or lemon drops    Things NOT included in clear liquid:     Milk and milk products  Milkshakes  Any solid food  Any solid soup with solid ingredients such as noodles  Any creamed soup  Gum or Mints  Orange juice (or any other juice containing pulp)        > Responsible adult must drive patient to the hospital, stay during surgery, and patient will need supervision 24 hours

## 2025-05-02 ENCOUNTER — HOSPITAL ENCOUNTER (OUTPATIENT)
Dept: LAB | Age: 65
Discharge: HOME | End: 2025-05-02
Payer: COMMERCIAL

## 2025-05-02 DIAGNOSIS — R97.20 ELEVATED PSA: ICD-10-CM

## 2025-05-02 LAB
APPEARANCE UR: CLEAR
BACTERIA URNS QL MICRO: NEGATIVE /HPF
BASOPHILS # BLD: 0.03 K/UL (ref 0–0.2)
BASOPHILS NFR BLD: 0.4 % (ref 0–2)
BILIRUB UR QL: NEGATIVE
COLOR UR: ABNORMAL
DIFFERENTIAL METHOD BLD: ABNORMAL
EOSINOPHIL # BLD: 0.11 K/UL (ref 0–0.8)
EOSINOPHIL NFR BLD: 1.5 % (ref 0.5–7.8)
EPI CELLS #/AREA URNS HPF: ABNORMAL /HPF
ERYTHROCYTE [DISTWIDTH] IN BLOOD BY AUTOMATED COUNT: 13.7 % (ref 11.9–14.6)
GLUCOSE UR STRIP.AUTO-MCNC: 100 MG/DL
HCT VFR BLD AUTO: 38.9 % (ref 41.1–50.3)
HGB BLD-MCNC: 12.1 G/DL (ref 13.6–17.2)
HGB UR QL STRIP: NEGATIVE
HYALINE CASTS URNS QL MICRO: ABNORMAL /LPF
IMM GRANULOCYTES # BLD AUTO: 0.02 K/UL (ref 0–0.5)
IMM GRANULOCYTES NFR BLD AUTO: 0.3 % (ref 0–5)
KETONES UR QL STRIP.AUTO: NEGATIVE MG/DL
LEUKOCYTE ESTERASE UR QL STRIP.AUTO: NEGATIVE
LYMPHOCYTES # BLD: 0.8 K/UL (ref 0.5–4.6)
LYMPHOCYTES NFR BLD: 10.9 % (ref 13–44)
MCH RBC QN AUTO: 25.5 PG (ref 26.1–32.9)
MCHC RBC AUTO-ENTMCNC: 31.1 G/DL (ref 31.4–35)
MCV RBC AUTO: 82.1 FL (ref 82–102)
MONOCYTES # BLD: 0.48 K/UL (ref 0.1–1.3)
MONOCYTES NFR BLD: 6.6 % (ref 4–12)
NEUTS SEG # BLD: 5.87 K/UL (ref 1.7–8.2)
NEUTS SEG NFR BLD: 80.3 % (ref 43–78)
NITRITE UR QL STRIP.AUTO: NEGATIVE
NRBC # BLD: 0 K/UL (ref 0–0.2)
PH UR STRIP: 6 (ref 5–9)
PLATELET # BLD AUTO: 231 K/UL (ref 150–450)
PMV BLD AUTO: 9.9 FL (ref 9.4–12.3)
PROT UR STRIP-MCNC: 100 MG/DL
RBC # BLD AUTO: 4.74 M/UL (ref 4.23–5.6)
RBC #/AREA URNS HPF: ABNORMAL /HPF
SP GR UR REFRACTOMETRY: 1.01 (ref 1–1.02)
UROBILINOGEN UR QL STRIP.AUTO: 0.2 EU/DL (ref 0.2–1)
WBC # BLD AUTO: 7.3 K/UL (ref 4.3–11.1)
WBC URNS QL MICRO: ABNORMAL /HPF

## 2025-05-02 PROCEDURE — 36415 COLL VENOUS BLD VENIPUNCTURE: CPT

## 2025-05-02 PROCEDURE — 81001 URINALYSIS AUTO W/SCOPE: CPT

## 2025-05-02 PROCEDURE — 85025 COMPLETE CBC W/AUTO DIFF WBC: CPT

## 2025-05-02 PROCEDURE — 87086 URINE CULTURE/COLONY COUNT: CPT

## 2025-05-02 NOTE — PROGRESS NOTES
CBC, UA within anesthesia guidelines, no follow-up required. Labs automatically routed to ordering provider via Epic documentation.

## 2025-05-04 LAB
BACTERIA SPEC CULT: NORMAL
SERVICE CMNT-IMP: NORMAL

## 2025-05-05 ENCOUNTER — RESULTS FOLLOW-UP (OUTPATIENT)
Dept: UROLOGY | Age: 65
End: 2025-05-05

## 2025-05-12 ENCOUNTER — ANESTHESIA EVENT (OUTPATIENT)
Dept: SURGERY | Age: 65
End: 2025-05-12
Payer: COMMERCIAL

## 2025-05-13 ENCOUNTER — ANESTHESIA (OUTPATIENT)
Dept: SURGERY | Age: 65
End: 2025-05-13
Payer: COMMERCIAL

## 2025-05-13 ENCOUNTER — HOSPITAL ENCOUNTER (OUTPATIENT)
Age: 65
Setting detail: OUTPATIENT SURGERY
Discharge: HOME | End: 2025-05-13
Attending: UROLOGY | Admitting: UROLOGY
Payer: COMMERCIAL

## 2025-05-13 VITALS
HEIGHT: 72 IN | SYSTOLIC BLOOD PRESSURE: 166 MMHG | DIASTOLIC BLOOD PRESSURE: 101 MMHG | OXYGEN SATURATION: 94 % | HEART RATE: 71 BPM | WEIGHT: 256 LBS | RESPIRATION RATE: 16 BRPM | BODY MASS INDEX: 34.67 KG/M2 | TEMPERATURE: 97.5 F

## 2025-05-13 DIAGNOSIS — R97.20 ELEVATED PSA: ICD-10-CM

## 2025-05-13 LAB
APPEARANCE UR: CLEAR
BACTERIA URNS QL MICRO: NEGATIVE /HPF
BILIRUB UR QL: NEGATIVE
COLOR UR: ABNORMAL
EPI CELLS #/AREA URNS HPF: ABNORMAL /HPF
GLUCOSE BLD STRIP.AUTO-MCNC: 90 MG/DL (ref 65–100)
GLUCOSE UR STRIP.AUTO-MCNC: 500 MG/DL
HGB UR QL STRIP: NEGATIVE
HYALINE CASTS URNS QL MICRO: ABNORMAL /LPF
KETONES UR QL STRIP.AUTO: NEGATIVE MG/DL
LEUKOCYTE ESTERASE UR QL STRIP.AUTO: NEGATIVE
NITRITE UR QL STRIP.AUTO: NEGATIVE
PH UR STRIP: 5.5 (ref 5–9)
POTASSIUM BLD-SCNC: 4 MMOL/L (ref 3.5–5.1)
PROT UR STRIP-MCNC: 300 MG/DL
RBC #/AREA URNS HPF: ABNORMAL /HPF
SERVICE CMNT-IMP: NORMAL
SP GR UR REFRACTOMETRY: 1.02 (ref 1–1.02)
UROBILINOGEN UR QL STRIP.AUTO: 0.2 EU/DL (ref 0.2–1)
WBC URNS QL MICRO: ABNORMAL /HPF

## 2025-05-13 PROCEDURE — 3700000000 HC ANESTHESIA ATTENDED CARE: Performed by: UROLOGY

## 2025-05-13 PROCEDURE — 7100000001 HC PACU RECOVERY - ADDTL 15 MIN: Performed by: UROLOGY

## 2025-05-13 PROCEDURE — 3700000001 HC ADD 15 MINUTES (ANESTHESIA): Performed by: UROLOGY

## 2025-05-13 PROCEDURE — 6360000002 HC RX W HCPCS: Performed by: ANESTHESIOLOGY

## 2025-05-13 PROCEDURE — 84132 ASSAY OF SERUM POTASSIUM: CPT

## 2025-05-13 PROCEDURE — 3600000002 HC SURGERY LEVEL 2 BASE: Performed by: UROLOGY

## 2025-05-13 PROCEDURE — 81001 URINALYSIS AUTO W/SCOPE: CPT

## 2025-05-13 PROCEDURE — 2580000003 HC RX 258: Performed by: ANESTHESIOLOGY

## 2025-05-13 PROCEDURE — 7100000010 HC PHASE II RECOVERY - FIRST 15 MIN: Performed by: UROLOGY

## 2025-05-13 PROCEDURE — 6360000002 HC RX W HCPCS: Performed by: NURSE ANESTHETIST, CERTIFIED REGISTERED

## 2025-05-13 PROCEDURE — 7100000000 HC PACU RECOVERY - FIRST 15 MIN: Performed by: UROLOGY

## 2025-05-13 PROCEDURE — 2580000003 HC RX 258: Performed by: NURSE PRACTITIONER

## 2025-05-13 PROCEDURE — 88305 TISSUE EXAM BY PATHOLOGIST: CPT

## 2025-05-13 PROCEDURE — 3600000012 HC SURGERY LEVEL 2 ADDTL 15MIN: Performed by: UROLOGY

## 2025-05-13 PROCEDURE — 82962 GLUCOSE BLOOD TEST: CPT

## 2025-05-13 PROCEDURE — 6370000000 HC RX 637 (ALT 250 FOR IP): Performed by: ANESTHESIOLOGY

## 2025-05-13 PROCEDURE — 2709999900 HC NON-CHARGEABLE SUPPLY: Performed by: UROLOGY

## 2025-05-13 PROCEDURE — 6360000002 HC RX W HCPCS: Performed by: NURSE PRACTITIONER

## 2025-05-13 RX ORDER — SODIUM CHLORIDE 0.9 % (FLUSH) 0.9 %
5-40 SYRINGE (ML) INJECTION EVERY 12 HOURS SCHEDULED
Status: DISCONTINUED | OUTPATIENT
Start: 2025-05-13 | End: 2025-05-13 | Stop reason: HOSPADM

## 2025-05-13 RX ORDER — SODIUM CHLORIDE, SODIUM LACTATE, POTASSIUM CHLORIDE, CALCIUM CHLORIDE 600; 310; 30; 20 MG/100ML; MG/100ML; MG/100ML; MG/100ML
INJECTION, SOLUTION INTRAVENOUS CONTINUOUS
Status: DISCONTINUED | OUTPATIENT
Start: 2025-05-13 | End: 2025-05-13 | Stop reason: HOSPADM

## 2025-05-13 RX ORDER — SODIUM CHLORIDE 9 MG/ML
INJECTION, SOLUTION INTRAVENOUS PRN
Status: DISCONTINUED | OUTPATIENT
Start: 2025-05-13 | End: 2025-05-13 | Stop reason: HOSPADM

## 2025-05-13 RX ORDER — IBUPROFEN 600 MG/1
1 TABLET ORAL PRN
Status: DISCONTINUED | OUTPATIENT
Start: 2025-05-13 | End: 2025-05-13 | Stop reason: HOSPADM

## 2025-05-13 RX ORDER — ONDANSETRON 2 MG/ML
4 INJECTION INTRAMUSCULAR; INTRAVENOUS
Status: DISCONTINUED | OUTPATIENT
Start: 2025-05-13 | End: 2025-05-13 | Stop reason: HOSPADM

## 2025-05-13 RX ORDER — HALOPERIDOL 5 MG/ML
1 INJECTION INTRAMUSCULAR
Status: DISCONTINUED | OUTPATIENT
Start: 2025-05-13 | End: 2025-05-13 | Stop reason: HOSPADM

## 2025-05-13 RX ORDER — NALOXONE HYDROCHLORIDE 0.4 MG/ML
INJECTION, SOLUTION INTRAMUSCULAR; INTRAVENOUS; SUBCUTANEOUS PRN
Status: DISCONTINUED | OUTPATIENT
Start: 2025-05-13 | End: 2025-05-13 | Stop reason: HOSPADM

## 2025-05-13 RX ORDER — SODIUM CHLORIDE 0.9 % (FLUSH) 0.9 %
5-40 SYRINGE (ML) INJECTION PRN
Status: DISCONTINUED | OUTPATIENT
Start: 2025-05-13 | End: 2025-05-13 | Stop reason: HOSPADM

## 2025-05-13 RX ORDER — LIDOCAINE HYDROCHLORIDE 20 MG/ML
INJECTION, SOLUTION EPIDURAL; INFILTRATION; INTRACAUDAL; PERINEURAL
Status: DISCONTINUED | OUTPATIENT
Start: 2025-05-13 | End: 2025-05-13 | Stop reason: SDUPTHER

## 2025-05-13 RX ORDER — OXYCODONE HYDROCHLORIDE 5 MG/1
5 TABLET ORAL
Status: DISCONTINUED | OUTPATIENT
Start: 2025-05-13 | End: 2025-05-13 | Stop reason: HOSPADM

## 2025-05-13 RX ORDER — DEXTROSE MONOHYDRATE 100 MG/ML
INJECTION, SOLUTION INTRAVENOUS CONTINUOUS PRN
Status: DISCONTINUED | OUTPATIENT
Start: 2025-05-13 | End: 2025-05-13 | Stop reason: HOSPADM

## 2025-05-13 RX ORDER — MIDAZOLAM HYDROCHLORIDE 2 MG/2ML
2 INJECTION, SOLUTION INTRAMUSCULAR; INTRAVENOUS
Status: COMPLETED | OUTPATIENT
Start: 2025-05-13 | End: 2025-05-13

## 2025-05-13 RX ORDER — ACETAMINOPHEN 500 MG
1000 TABLET ORAL ONCE
Status: COMPLETED | OUTPATIENT
Start: 2025-05-13 | End: 2025-05-13

## 2025-05-13 RX ORDER — LIDOCAINE HYDROCHLORIDE 10 MG/ML
1 INJECTION, SOLUTION INFILTRATION; PERINEURAL
Status: DISCONTINUED | OUTPATIENT
Start: 2025-05-13 | End: 2025-05-13 | Stop reason: HOSPADM

## 2025-05-13 RX ORDER — PROPOFOL 10 MG/ML
INJECTION, EMULSION INTRAVENOUS
Status: DISCONTINUED | OUTPATIENT
Start: 2025-05-13 | End: 2025-05-13 | Stop reason: SDUPTHER

## 2025-05-13 RX ADMIN — PROPOFOL 50 MG: 10 INJECTION, EMULSION INTRAVENOUS at 15:21

## 2025-05-13 RX ADMIN — PROPOFOL 140 MCG/KG/MIN: 10 INJECTION, EMULSION INTRAVENOUS at 15:22

## 2025-05-13 RX ADMIN — SODIUM CHLORIDE, SODIUM LACTATE, POTASSIUM CHLORIDE, AND CALCIUM CHLORIDE: .6; .31; .03; .02 INJECTION, SOLUTION INTRAVENOUS at 12:53

## 2025-05-13 RX ADMIN — CEFTRIAXONE SODIUM 2000 MG: 2 INJECTION, POWDER, FOR SOLUTION INTRAMUSCULAR; INTRAVENOUS at 15:23

## 2025-05-13 RX ADMIN — LIDOCAINE HYDROCHLORIDE 40 MG: 20 INJECTION, SOLUTION EPIDURAL; INFILTRATION; INTRACAUDAL; PERINEURAL at 15:21

## 2025-05-13 RX ADMIN — ACETAMINOPHEN 1000 MG: 500 TABLET, FILM COATED ORAL at 12:47

## 2025-05-13 RX ADMIN — MIDAZOLAM HYDROCHLORIDE 2 MG: 1 INJECTION, SOLUTION INTRAMUSCULAR; INTRAVENOUS at 14:58

## 2025-05-13 ASSESSMENT — PAIN - FUNCTIONAL ASSESSMENT: PAIN_FUNCTIONAL_ASSESSMENT: 0-10

## 2025-05-13 ASSESSMENT — LIFESTYLE VARIABLES: SMOKING_STATUS: 1

## 2025-05-13 NOTE — H&P
Arvind Rodas  : 1960         Chief Complaint   Patient presents with    Follow-up            HPI      Arvind Rodas is a 64 y.o. male  Referred back for elevated PSA. He was seen Dr. Nixon in the past. Last visit was . At that time PSA was elevated to 11.1. A MRI was done and it showed one PI-RADS-3 lesion.  No other suspicious areas.      Pt's maternal grandfather  of prostate cancer.  Walks with a cane because of back problems. Is going to have a spinal fusion.      Current PSA is 14.6  PSA  was 8.6  PSA  was 11.1  PSA  was 12.9     Pt has not complaint today. He is voiding well.      Past Medical History        Past Medical History:   Diagnosis Date    Diabetes mellitus (HCC)      Elevated PSA 2022    Hypertension           Past Surgical History         Past Surgical History:   Procedure Laterality Date    BLADDER SURGERY             Current Facility-Administered Medications          Current Outpatient Medications   Medication Sig Dispense Refill    labetalol (NORMODYNE) 200 MG tablet 1 tablet        Semaglutide,0.25 or 0.5MG/DOS, (OZEMPIC, 0.25 OR 0.5 MG/DOSE,) 2 MG/3ML SOPN Inject 0.5 mg into the skin every 7 days        JARDIANCE 25 MG tablet 1 tablet        glimepiride (AMARYL) 2 MG tablet Take 1 tablet by mouth 2 times daily        linaclotide (LINZESS) 290 MCG CAPS capsule 90        Omega-3 Fatty Acids (FISH OIL) 1000 MG capsule 1 capsule        spironolactone (ALDACTONE) 25 MG tablet Take 1 tablet by mouth 2 times daily        meloxicam (MOBIC) 7.5 MG tablet Take 1 tablet by mouth daily        cloNIDine (CATAPRES) 0.3 MG tablet TAKE 1 TABLET BY MOUTH TWICE DAILY FOR 90 DAYS        fenofibrate (TRICOR) 54 MG tablet TAKE 1 TABLET BY MOUTH ONCE DAILY FOR 90 DAYS        hydrALAZINE (APRESOLINE) 50 MG tablet TAKE 1 TABLET BY MOUTH WITH FOOD THREE TIMES DAILY FOR 90 DAYS        metoprolol succinate (TOPROL XL) 100 MG extended release tablet TAKE 1 TABLET BY MOUTH IN THE  demonstrates increased focal restricted diffusion compared to  the prior exam. Capsule intact. PI-RADS 4     - TRANSITION ZONE: Moderate hypertrophic changes.  No significant discrete mass.     - SEMINAL VESICLES: Unremarkable.     - PELVIC METASTATIC DISEASE: No suspicious lymphadenopathy or bony lesions.     - SIGNIFICANT ADDITIONAL FINDINGS: None.     IMPRESSION:  14 mm right peripheral zone lesion, increased restricted diffusion.  PI-RADS 4  Plan Mri fusion transrectal prostate biopsy.

## 2025-05-13 NOTE — ANESTHESIA PRE PROCEDURE
MD Yolande       Current medications:    Current Facility-Administered Medications   Medication Dose Route Frequency Provider Last Rate Last Admin    lidocaine 1 % injection 1 mL  1 mL IntraDERmal Once PRN Rebekah Russo MD        lactated ringers infusion   IntraVENous Continuous Rebekah Russo  mL/hr at 05/13/25 1253 New Bag at 05/13/25 1253    sodium chloride flush 0.9 % injection 5-40 mL  5-40 mL IntraVENous 2 times per day Rebekah Russo MD        sodium chloride flush 0.9 % injection 5-40 mL  5-40 mL IntraVENous PRN Rebekah Russo MD        0.9 % sodium chloride infusion   IntraVENous PRN Rebekah Russo MD        midazolam PF (VERSED) injection 2 mg  2 mg IntraVENous Once PRN Rebekah Russo MD        cefTRIAXone (ROCEPHIN) 2,000 mg in sodium chloride 0.9 % 50 mL IVPB (addEASE)  2,000 mg IntraVENous On Call to OR Dafne Walden APRN - NP   Held at 05/13/25 1248       Allergies:  No Known Allergies    Problem List:    Patient Active Problem List   Diagnosis Code    Elevated PSA R97.20       Past Medical History:        Diagnosis Date    Cardiorenal disease     Chronic pain     Cigar smoker     CKD (chronic kidney disease) stage 4, GFR 15-29 ml/min (HCC)     DDD (degenerative disc disease), lumbar     Diabetes mellitus (HCC)     oral agents and insulin, does not check BS, A1c 5.9 per patient, S&S of hypoglycemia- has not checked BS    Elevated PSA 9/04/2022    Elevated PSA     Hypertension     Hypertensive heart failure (HCC)     OSCAR on CPAP     uses cpap       Past Surgical History:        Procedure Laterality Date    BACK SURGERY      BLADDER SURGERY      x2    CHOLECYSTECTOMY         Social History:    Social History     Tobacco Use    Smoking status: Some Days     Types: Cigars    Smokeless tobacco: Never    Tobacco comments:     cigars   Substance Use Topics    Alcohol use: Not Currently     Comment: 1 glass of wine per month                                Ready to quit: Not

## 2025-05-13 NOTE — OP NOTE
Operative Note      Patient: Arvind Rodas  YOB: 1960  MRN: [id number]    Date of Procedure: 5/13/2025    Pre-Op Diagnosis Codes:      * Elevated PSA [R97.20]    Post-Op Diagnosis: Same       Procedure(s):  PROSTATE BIOPSY FUSION    Surgeon(s):  Tj Nixon Jr., MD    Assistant:   * No surgical staff found *    Anesthesia: Monitor Anesthesia Care    Estimated Blood Loss (mL): Minimal    Complications: None    Specimens:   ID Type Source Tests Collected by Time Destination   A : Right TESSA Tissue Prostate SURGICAL PATHOLOGY Tj Nixon Jr., MD 5/13/2025 1530        Implants:  * No implants in log *      Drains: * No LDAs found *    Findings:  Infection Present At Time Of Surgery (PATOS) (choose all levels that have infection present):  No infection present  Other Findings: see op note    Detailed Description of Procedure:   Operative Note                Patient: Arvind Rodas, [id number]    Date of Surgery: 05/13/25    Preoperative Diagnosis: Elevated psa and prostate lesion(s) on MRI imaging    Postoperative Diagnosis:  same    Surgeon(s) and Role:     *Tj Nixon MD - Primary     Anesthesia:  MAC     Procedure: Procedure(s):  MRI fused TRUS guided prostate biopsy     RISKS DISCUSSION:     Discussed the risk of surgery including infection, hematoma, bleeding, and the risks of general anesthetic.  The patient understands the risks, any and all questions were answered to the patient's satisfaction and they freely signed the consent for operation.     MRI FUSED TRANSRECTAL ULTRASOUND GUIDED BIOPSY OF THE PROSTATE    All risks, benefits and alternatives were again reviewed and he is willing to proceed at this time.  The patient was placed in the left lateral decubitus position.  I then inserted the transrectal ultrasound probe into the rectum.  The prostate was visualized.  The prostate appeared homogenous in appearance.   MRI images were linked to ultrasonographic images.

## 2025-05-13 NOTE — PERIOP NOTE
TRANSFER - OUT REPORT:    Verbal report given to KAITLYN Andrade on Arvind Rodas  being transferred to [facility] for ordered procedure       Report consisted of patient's Situation, Background, Assessment and   Recommendations(SBAR).     Information from the following report(s) Adult Overview, MAR, Recent Results, and Med Rec Status was reviewed with the receiving nurse.           Lines:   Peripheral IV 05/13/25 Posterior;Right Hand (Active)   Site Assessment Clean, dry & intact 05/13/25 1252   Line Status Blood return noted;Infusing 05/13/25 1252   Line Care Connections checked and tightened 05/13/25 1252   Phlebitis Assessment No symptoms 05/13/25 1252   Infiltration Assessment 0 05/13/25 1252   Dressing Status Clean, dry & intact;New dressing applied 05/13/25 1252   Dressing Type Transparent 05/13/25 1252        Opportunity for questions and clarification was provided.      Patient transported with:  Tech

## 2025-05-13 NOTE — ANESTHESIA POSTPROCEDURE EVALUATION
Department of Anesthesiology  Postprocedure Note    Patient: Arvind Rodas  MRN: [id number]  YOB: 1960  Date of evaluation: 5/13/2025    Procedure Summary       Date: 05/13/25 Room / Location: [facility] OP OR 08 / [facility]    Anesthesia Start: 1515 Anesthesia Stop: 1548    Procedure: PROSTATE BIOPSY FUSION Diagnosis:       Elevated PSA      (Elevated PSA [R97.20])    Surgeons: Tj Nixon Jr., MD Responsible Provider: PAVAN Islas MD    Anesthesia Type: TIVA ASA Status: 3            Anesthesia Type: No value filed.    Kaitlin Phase I: Kaitlin Score: 10    Kaitlin Phase II:      Anesthesia Post Evaluation    Patient location during evaluation: PACU  Patient participation: complete - patient participated  Level of consciousness: awake and alert  Airway patency: patent  Nausea & Vomiting: no nausea and no vomiting  Cardiovascular status: hemodynamically stable  Respiratory status: acceptable  Hydration status: euvolemic  Comments: Blood pressure (!) 135/93, pulse 88, temperature 97.5 °F (36.4 °C), temperature source Temporal, resp. rate 14, height 1.829 m (6'), weight 116.1 kg (256 lb), SpO2 94%.    No apparent anesthetic complications.  Pt stable for discharge from PACU  Multimodal analgesia pain management approach  Pain management: adequate    No notable events documented.

## 2025-05-21 ENCOUNTER — OFFICE VISIT (OUTPATIENT)
Dept: UROLOGY | Age: 65
End: 2025-05-21
Payer: COMMERCIAL

## 2025-05-21 ENCOUNTER — RESULTS FOLLOW-UP (OUTPATIENT)
Dept: UROLOGY | Age: 65
End: 2025-05-21

## 2025-05-21 DIAGNOSIS — C61 PROSTATE CANCER (HCC): Primary | ICD-10-CM

## 2025-05-21 PROCEDURE — 99214 OFFICE O/P EST MOD 30 MIN: CPT | Performed by: UROLOGY

## 2025-05-21 NOTE — PROGRESS NOTES
[facility]  [address] [place] [id number]  [phone]    Arvind Rodas  : 1960    Chief Complaint   Patient presents with    Follow-up     Pathology report        HPI     Arvind Rodas is a 65 y.o. male       Arvind Rodas  : 1960           Chief Complaint   Patient presents with    Follow-up            HPI      Arivnd Rodas is a 64 y.o. male  Referred back for elevated PSA. He was seen Dr. Nixon in the past. Last visit was . At that time PSA was elevated to 11.1. A MRI was done and it showed one PI-RADS-3 lesion.  No other suspicious areas.      Pt's maternal grandfather  of prostate cancer.  Walks with a cane because of back problems. Is going to have a spinal fusion.      Current PSA is 14.6  PSA  was 8.6  PSA  was 11.1  PSA  was 12.9         MRI prostate 25:    FINDINGS:     - PROSTATE: 4.7 x 3.7 x 3.0 cm. Volume is 27 mL.     - PERIPHERAL ZONE: Persistent chronic changes noted bilaterally.  Lesion #1: 14 mm low signal lesion in the posterior lateral right mid gland  peripheral zone demonstrates increased focal restricted diffusion compared to  the prior exam. Capsule intact. PI-RADS 4     - TRANSITION ZONE: Moderate hypertrophic changes.  No significant discrete mass.     - SEMINAL VESICLES: Unremarkable.     - PELVIC METASTATIC DISEASE: No suspicious lymphadenopathy or bony lesions.     - SIGNIFICANT ADDITIONAL FINDINGS: None.     IMPRESSION:  14 mm right peripheral zone lesion, increased restricted diffusion.  PI-RADS 4  MRI fusion prostate biopsy on 25: PROSTATE VOLUME:  34 gm  PSA 14.6  PSAD 0.43   Final Pathologic Diagnosis     Prostate Gland, needle biopsies   A. Right TESSA:   - Adenocarcinoma, Leasburg score 4 + 3 = 7 (Grade Group 3), involving 4 of   4 prostate core(s) (considered 1 core for     targeted lesions by NCCN), 80% of the biopsy.   - Percent of carcinoma that is Pattern 4 = 70%.   - Perineural

## 2025-06-04 ENCOUNTER — HOSPITAL ENCOUNTER (OUTPATIENT)
Dept: PET IMAGING | Age: 65
Discharge: HOME | End: 2025-06-07
Payer: COMMERCIAL

## 2025-06-04 DIAGNOSIS — C61 PROSTATE CANCER (HCC): ICD-10-CM

## 2025-06-04 PROCEDURE — 3430000000 HC RX DIAGNOSTIC RADIOPHARMACEUTICAL: Performed by: UROLOGY

## 2025-06-04 PROCEDURE — 2500000003 HC RX 250 WO HCPCS: Performed by: UROLOGY

## 2025-06-04 PROCEDURE — 78815 PET IMAGE W/CT SKULL-THIGH: CPT

## 2025-06-04 PROCEDURE — A9596 HC RX DIAGNOSTIC RADIOPHARMACEUTICAL: HCPCS | Performed by: UROLOGY

## 2025-06-04 RX ORDER — SODIUM CHLORIDE 0.9 % (FLUSH) 0.9 %
20 SYRINGE (ML) INJECTION AS NEEDED
Status: DISCONTINUED | OUTPATIENT
Start: 2025-06-04 | End: 2025-06-08 | Stop reason: HOSPADM

## 2025-06-04 RX ADMIN — KIT FOR THE PREPARATION OF GALLIUM GA 68 GOZETOTIDE INJECTION 4.72 MILLICURIE: KIT INTRAVENOUS at 14:05

## 2025-06-04 RX ADMIN — SODIUM CHLORIDE, PRESERVATIVE FREE 20 ML: 5 INJECTION INTRAVENOUS at 14:05

## 2025-06-10 ENCOUNTER — RESULTS FOLLOW-UP (OUTPATIENT)
Dept: UROLOGY | Age: 65
End: 2025-06-10

## 2025-06-11 ENCOUNTER — OFFICE VISIT (OUTPATIENT)
Dept: UROLOGY | Age: 65
End: 2025-06-11
Payer: COMMERCIAL

## 2025-06-11 DIAGNOSIS — C61 PROSTATE CANCER (HCC): Primary | ICD-10-CM

## 2025-06-11 PROCEDURE — 99214 OFFICE O/P EST MOD 30 MIN: CPT | Performed by: UROLOGY

## 2025-06-11 RX ORDER — MINOXIDIL 10 MG/1
10 TABLET ORAL DAILY
COMMUNITY

## 2025-06-11 RX ORDER — SACUBITRIL AND VALSARTAN 24; 26 MG/1; MG/1
1 TABLET, FILM COATED ORAL 2 TIMES DAILY
COMMUNITY
Start: 2024-12-13

## 2025-06-11 ASSESSMENT — ENCOUNTER SYMPTOMS
BACK PAIN: 0
NAUSEA: 0

## 2025-06-11 NOTE — PROGRESS NOTES
discuss surgical risk with his cardiologist ,     Follow-up and Dispositions    Return for Will call for appointment.

## 2025-06-17 ENCOUNTER — TELEPHONE (OUTPATIENT)
Dept: UROLOGY | Age: 65
End: 2025-06-17

## 2025-06-17 NOTE — PROGRESS NOTES
Urologic Oncology  [facility]  [address] [place] [address]  [phone]          Arvind Rodas  : 1960      INITIAL EVALUATION    Chief Complaint   Patient presents with    New Patient       HPI: Arvind Rodas is a 65 y.o. male with stage T1c grade 4+4=8 prostate cancer.  Patient is here today with his wife.  He is here for a second opinion on his recent diagnosis of hide risk prostate cancer.  He has been a patient of Dr. Nieves.    He is originally from [place] but moved here about 20 years ago with his [employer].    Patient has a history of an elevated PSA.  His PSA was 12.9 on 10/3/2022.  There is a report of a PSA of 14.6 more recently.  He had an MRI of the prostate on 10/17/2025 that showed a 2.9 cm PI-RADS 3 lesion.  He also had an MRI of his prostate back in 2025 that showed a 1.4 mm right peripheral zone PI-RADS 4 lesion.    He underwent a prostate biopsy on 2025 that showed multiple positive cores.  He had a single core of Nereida 4+4 = 8 involving 70% of the biopsy with PXE and perineural invasion.  He additionally had 6 cores of 4+3 equal 7 involving high percentage of the core as well as perineural invasion and most of them.  He had an additional core of Atlanta 3+3 and 3 of Atlanta 3+4.  All of his cores were positive for cancer.    His PSMA PET scan from 2025 showed no evidence of metastatic disease or extra prostatic disease although there was extensive involvement of his prostate gland.      Importantly patient has a history of chronic renal failure.  On 3/21/2025 his creatinine was 4.13.  He is followed by [facility].  He has been told he likely would need a transplant or dialysis at some point in the future.    He also has a history of congestive heart failure and is followed by Dr. Kirkpatrick.  He saw him recently and according the patient gave him cardiac clearance for possible

## 2025-06-17 NOTE — PROGRESS NOTES
Nereida score 3 + 4 = 7 (Grade Group 2), involving 1   prostate core(s), 5% of the biopsy.   - Percent of carcinoma that is Pattern 4 = 10%.   - Perineural invasion is present.     C. Left base:   Benign prostate tissue.     D. Right base:   - Adenocarcinoma, Nereiad score 3 + 4 = 7 (Grade Group 2), discontinuously   involving 1 prostate core(s), 60% of the     biopsy.   - Percent of carcinoma that is Pattern 4 = 20%.   - Perineural invasion is present.     E. Right lateral base:   - Adenocarcinoma, Nereida score 4 + 3 = 7 (Grade Group 3), involving 1   prostate core(s), 90% of the biopsy.   - Percent of carcinoma that is Pattern 4 = 90% (cribriform pattern is   present).   - Perineural invasion is present.     F. Left lateral mid:   - Adenocarcinoma, Grapeview score 3 + 3 = 6 (Grade Group 1), involving 1   prostate core(s), 20% of the biopsy.   - Perineural invasion is present.     G. Left mid:    Focal glandular atypia.     H. Right mid:   - Adenocarcinoma, Grapeview score 4 + 4 = 8 (Grade Group 4), involving 1   prostate core(s), 75% of the biopsy.   - Focal extraprostatic extension and perineural invasion is present.     I. Right lateral mid:   - Adenocarcinoma, Grapeview score 4 + 3 = 7 (Grade Group 3), involving 1   prostate core(s), 70% of the biopsy.   - Percent of carcinoma that is Pattern 4 = 80%.   - Perineural invasion is present.     J. Left lateral apex:   - Adenocarcinoma, Nereida score 3 + 4 = 7 (Grade Group 2), involving 1   prostate core(s), 60% of the biopsy.   - Percent of carcinoma that is Pattern 4 = 10%.   - Perineural invasion is present.     K. Left apex:   - Adenocarcinoma, Nereida score 4 + 3 = 7 (Grade Group 3), involving 1   prostate core(s), 25% of the biopsy.   - Percent of carcinoma that is Pattern 4 = 70%.     L. Right apex:   - Adenocarcinoma, Grapeview score 4 + 3 = 7 (Grade Group 3), involving 1   prostate core(s), 95% of the biopsy.   - Percent of carcinoma that is Pattern 4 = 80%

## 2025-06-23 ENCOUNTER — OFFICE VISIT (OUTPATIENT)
Age: 65
End: 2025-06-23
Payer: COMMERCIAL

## 2025-06-23 VITALS
DIASTOLIC BLOOD PRESSURE: 102 MMHG | WEIGHT: 262.2 LBS | OXYGEN SATURATION: 98 % | HEART RATE: 76 BPM | HEIGHT: 72 IN | RESPIRATION RATE: 17 BRPM | SYSTOLIC BLOOD PRESSURE: 164 MMHG | BODY MASS INDEX: 35.51 KG/M2

## 2025-06-23 DIAGNOSIS — C61 PROSTATE CANCER (HCC): Primary | ICD-10-CM

## 2025-06-23 PROCEDURE — 99215 OFFICE O/P EST HI 40 MIN: CPT | Performed by: UROLOGY

## 2025-06-23 RX ORDER — METOPROLOL SUCCINATE 100 MG/1
100 TABLET, EXTENDED RELEASE ORAL 3 TIMES DAILY
COMMUNITY
Start: 2025-06-13

## 2025-06-23 ASSESSMENT — ENCOUNTER SYMPTOMS
GASTROINTESTINAL NEGATIVE: 1
RESPIRATORY NEGATIVE: 1

## 2025-06-23 ASSESSMENT — PATIENT HEALTH QUESTIONNAIRE - PHQ9
1. LITTLE INTEREST OR PLEASURE IN DOING THINGS: NOT AT ALL
SUM OF ALL RESPONSES TO PHQ QUESTIONS 1-9: 0
2. FEELING DOWN, DEPRESSED OR HOPELESS: NOT AT ALL
SUM OF ALL RESPONSES TO PHQ QUESTIONS 1-9: 0

## 2025-06-23 NOTE — PATIENT INSTRUCTIONS
UA 05/13/2025 5-10  /lpf Final    POC Potassium 05/13/2025 4.0  3.5 - 5.1 MMOL/L Final    POC Glucose 05/13/2025 90  65 - 100 mg/dL Final    Comment: 47 - 60 mg/dl Consistent with, but not fully diagnostic of hypoglycemia.  101 - 125 mg/dl Impaired fasting glucose/consistent with pre-diabetes mellitus  > 126 mg/dl Fasting glucose consistent with overt diabetes mellitus      Performed by: 05/13/2025 Teri   Final           Please refer to After Visit Summary or [employer] for upcoming appointment information. Please call our office for rescheduling needs at least 24 hours before your scheduled appointment time.If you have any questions regarding your upcoming schedule please reach out to your care team through [employer] or call [phone].     Please notify your assigned Nurse Navigator of any unplanned hospital admissions or Emergency Room visits within 24 hours of discharge.    -------------------------------------------------------------------------------------------------------------------  Please call our office at [phone] if you have any  of the following symptoms:   Fever of 100.5 or greater  Chills  Shortness of breath  Swelling or pain in one leg    After office hours an answering service is available and will contact a provider for emergencies or if you are experiencing any of the above symptoms.        Chika Ng MA

## 2025-06-24 ENCOUNTER — TUMOR BOARD CONFERENCE (OUTPATIENT)
Dept: CASE MANAGEMENT | Age: 65
End: 2025-06-24

## 2025-06-24 NOTE — TUMOR BOARD NOTE
Genitourinary Multidisciplinary Conference Summary    Specialties Present: Genetics, Medical Oncology, Navigation, Pathology, Radiation Oncology, Radiology, Surgical Oncology, and Urology    Treatment to Date: None    Imaging Reviewed: CT and PET    Recommendations: Imaging review demonstrated an L5 lesion suspicious for metastatic disease. Pt has already been scheduled for radiation oncology consultation. Recommend referral to medical oncology for consideration of ADT.

## 2025-06-24 NOTE — PROGRESS NOTES
[facility] RADIATION ONCOLOGY CONSULTATION    Patient: Arvind Rodas MRN: [id number]  SSN: [id number]    YOB: 1960  Age: 65 y.o.  Sex: male      Other Providers:  Av Heredia MD    CHIEF COMPLAINT: Elevated PSA    DIAGNOSIS: Stage IVB cT1 cN0 M1 oligometastatic prostatic adenocarcinoma, Walsh score 4+4 = 8, initial PSA 12.9    PREVIOUS RADIATION TREATMENT:  None    HISTORY OF PRESENT ILLNESS:  Arvind Rodas is a 65 y.o. male who I am seeing at the request of Av Heredia MD. he has a past medical history of chronic kidney disease and congestive heart failure.  He was found to have an elevated PSA on 10/3/2022 at 12.9.  Reportedly more recent PSA returned at 14.6, but I do not have access to this record.  He had an MRI of the prostate on 10/17/2024 which showed a 2.9 cm PI-RADS 3 lesion as well as a prostate MRI on 4/8/2025 which demonstrated a 14 mm right PZ lesion consistent with PI-RADS 4.  The prostate volume was 27 cc.  He underwent prostate biopsy on 5/13/2025 which demonstrated 1 core Walsh 4+4 = 8, and a mixture of Walsh 4+3 = 7, 3+4 = 7, and 3+3 = 6.  PSMA PET/CT was completed on 6/4/2025 which specifically was read as no evidence of extraprostatic disease with extensive involvement within the prostate gland with no skeletal or PSMA avid lymphadenopathy; however, group review in multidisciplinary tumor board on 6/24/2025 identified a focal area of intense uptake involving the L5 vertebral body to the right of midline with an SUV of up to 14.4 highly suggestive of a solitary metastatic focus of disease.  This information was added to the official report as an addendum on 6/24/2025.  The group consensus was that he is not a surgical candidate due to his medical comorbidities and his newly identified site of oligometastatic disease.  He was referred to radiation oncology for discussion of definitive intent radiation to

## 2025-06-25 ENCOUNTER — HOSPITAL ENCOUNTER (OUTPATIENT)
Dept: RADIATION ONCOLOGY | Age: 65
Setting detail: RECURRING SERIES
Discharge: HOME | End: 2025-06-28
Payer: COMMERCIAL

## 2025-06-25 VITALS
WEIGHT: 263.7 LBS | BODY MASS INDEX: 35.76 KG/M2 | DIASTOLIC BLOOD PRESSURE: 92 MMHG | SYSTOLIC BLOOD PRESSURE: 140 MMHG | HEART RATE: 76 BPM | RESPIRATION RATE: 16 BRPM | OXYGEN SATURATION: 96 % | TEMPERATURE: 98.7 F

## 2025-06-25 DIAGNOSIS — C61 PROSTATE CANCER (HCC): Primary | ICD-10-CM

## 2025-06-25 PROCEDURE — 77470 SPECIAL RADIATION TREATMENT: CPT

## 2025-06-25 PROCEDURE — 99211 OFF/OP EST MAY X REQ PHY/QHP: CPT

## 2025-06-25 RX ORDER — DIPHENHYDRAMINE HYDROCHLORIDE 50 MG/ML
50 INJECTION, SOLUTION INTRAMUSCULAR; INTRAVENOUS
OUTPATIENT
Start: 2025-07-03

## 2025-06-25 RX ORDER — EPINEPHRINE 1 MG/ML
0.3 INJECTION, SOLUTION, CONCENTRATE INTRAVENOUS PRN
OUTPATIENT
Start: 2025-07-03

## 2025-06-25 RX ORDER — ACETAMINOPHEN 325 MG/1
650 TABLET ORAL
OUTPATIENT
Start: 2025-07-03

## 2025-06-25 RX ORDER — HYDROCORTISONE SODIUM SUCCINATE 100 MG/2ML
100 INJECTION INTRAMUSCULAR; INTRAVENOUS
OUTPATIENT
Start: 2025-07-03

## 2025-06-25 RX ORDER — ALBUTEROL SULFATE 90 UG/1
4 INHALANT RESPIRATORY (INHALATION) PRN
OUTPATIENT
Start: 2025-07-03

## 2025-06-25 RX ORDER — ONDANSETRON 2 MG/ML
8 INJECTION INTRAMUSCULAR; INTRAVENOUS
OUTPATIENT
Start: 2025-07-03

## 2025-06-25 RX ORDER — SODIUM CHLORIDE 9 MG/ML
INJECTION, SOLUTION INTRAVENOUS PRN
OUTPATIENT
Start: 2025-07-03

## 2025-06-25 NOTE — PROGRESS NOTES
Radiation Oncology - New Patient        Arvind Rodas  is a 65 y.o. year old male with prostate cancer who presents for: Initial consult.    Proposed Radiotherapy: External Beam Radiation Therapy       Intent of therapy and anticipated number of fractions (length of treatment) reviewed by MD. See MD note.     Bowel Prep:YES    Menopausal Status: Not Applicable      Consent for Radiotherapy: Signed and scanned into chart    CT Sim Scheduled: No, to be scheduled following Lupron shot    Lupron shot ordered  Referral to Med Onc for consideration of ARPI    Pertinent Information:    Vitals:    25 0924   BP: (!) 140/92   Pulse: 76   Resp: 16   Temp: 98.7 °F (37.1 °C)   SpO2: 96%   Pain: 0/10    Previous Radiation Treatment Reported: No    Pacemaker or Other Chest Implant Reported: No    Collagen Vascular Disease Reported: No    Test Results, if applicable:  PSA: 12.9  AUA: 5  PET / CT / PSMA: PET-CT 2025  Colonoscopy: 5-6 years ago, will need to schedule     Education: Reviewed during this visit.     Radiation Therapy Education:    The patient was given handouts for area of planned treatment and potential side effects, frequently asked questions, skin care, and vitamin recommendations for reference.      Self-care guidelines were distributed and discussed with the patient and included the followin) Potential long term and short term side effects of therapy including fertility risks for appropriate patients    2) Symptoms and side effects that require the patient to contact [facility] or require immediate attention    3) The [facility]'s contact information with availability and instructions on who and when to call    4) The [facility] missed appointment policy and expectations for rescheduling or canceling    Patient asked appropriate questions and was involved and engaged during the educational session.  There were no

## 2025-07-07 ENCOUNTER — HOSPITAL ENCOUNTER (OUTPATIENT)
Dept: INFUSION THERAPY | Age: 65
Setting detail: INFUSION SERIES
Discharge: HOME | End: 2025-07-07
Payer: COMMERCIAL

## 2025-07-07 VITALS
OXYGEN SATURATION: 94 % | TEMPERATURE: 98 F | HEART RATE: 82 BPM | RESPIRATION RATE: 16 BRPM | SYSTOLIC BLOOD PRESSURE: 119 MMHG | DIASTOLIC BLOOD PRESSURE: 76 MMHG

## 2025-07-07 DIAGNOSIS — C61 PROSTATE CANCER (HCC): Primary | ICD-10-CM

## 2025-07-07 PROCEDURE — 6360000002 HC RX W HCPCS: Performed by: STUDENT IN AN ORGANIZED HEALTH CARE EDUCATION/TRAINING PROGRAM

## 2025-07-07 PROCEDURE — 96402 CHEMO HORMON ANTINEOPL SQ/IM: CPT

## 2025-07-07 RX ORDER — ONDANSETRON 2 MG/ML
8 INJECTION INTRAMUSCULAR; INTRAVENOUS
OUTPATIENT
Start: 2025-12-22

## 2025-07-07 RX ORDER — ACETAMINOPHEN 325 MG/1
650 TABLET ORAL
OUTPATIENT
Start: 2025-12-22

## 2025-07-07 RX ORDER — DIPHENHYDRAMINE HYDROCHLORIDE 50 MG/ML
50 INJECTION, SOLUTION INTRAMUSCULAR; INTRAVENOUS
OUTPATIENT
Start: 2025-12-22

## 2025-07-07 RX ORDER — EPINEPHRINE 1 MG/ML
0.3 INJECTION, SOLUTION, CONCENTRATE INTRAVENOUS PRN
OUTPATIENT
Start: 2025-12-22

## 2025-07-07 RX ORDER — HYDROCORTISONE SODIUM SUCCINATE 100 MG/2ML
100 INJECTION INTRAMUSCULAR; INTRAVENOUS
OUTPATIENT
Start: 2025-12-22

## 2025-07-07 RX ORDER — SODIUM CHLORIDE 9 MG/ML
INJECTION, SOLUTION INTRAVENOUS PRN
OUTPATIENT
Start: 2025-12-22

## 2025-07-07 RX ORDER — ALBUTEROL SULFATE 90 UG/1
4 INHALANT RESPIRATORY (INHALATION) PRN
OUTPATIENT
Start: 2025-12-22

## 2025-07-07 RX ADMIN — LEUPROLIDE ACETATE 45 MG: KIT at 16:16

## 2025-07-07 NOTE — PROGRESS NOTES
Ar[facility] to [facility] ambulatory.  Lupron injection completed.   Provided education on side effects.    Patient instructed to report any side affects to ordering provider.  Patient tolerated well.   Any issues or concerns during appointment: none.  Patient aware of next appointment on 7/21/2025 (date) at 0830 (time).  Discharged home ambulatory.

## 2025-07-09 ENCOUNTER — HOSPITAL ENCOUNTER (OUTPATIENT)
Dept: RADIATION ONCOLOGY | Age: 65
Setting detail: RECURRING SERIES
End: 2025-07-09
Payer: COMMERCIAL

## 2025-07-10 ENCOUNTER — HOSPITAL ENCOUNTER (OUTPATIENT)
Dept: RADIATION ONCOLOGY | Age: 65
Setting detail: RECURRING SERIES
Discharge: HOME | End: 2025-07-13
Payer: COMMERCIAL

## 2025-07-18 ENCOUNTER — CLINICAL DOCUMENTATION (OUTPATIENT)
Dept: CASE MANAGEMENT | Age: 65
End: 2025-07-18

## 2025-07-18 SDOH — HEALTH STABILITY: PHYSICAL HEALTH: ON AVERAGE, HOW MANY MINUTES DO YOU ENGAGE IN EXERCISE AT THIS LEVEL?: 40 MIN

## 2025-07-18 SDOH — ECONOMIC STABILITY: INCOME INSECURITY: IN THE LAST 12 MONTHS, WAS THERE A TIME WHEN YOU WERE NOT ABLE TO PAY THE MORTGAGE OR RENT ON TIME?: NO

## 2025-07-18 SDOH — ECONOMIC STABILITY: FOOD INSECURITY: WITHIN THE PAST 12 MONTHS, YOU WORRIED THAT YOUR FOOD WOULD RUN OUT BEFORE YOU GOT MONEY TO BUY MORE.: NEVER TRUE

## 2025-07-18 SDOH — ECONOMIC STABILITY: FOOD INSECURITY: WITHIN THE PAST 12 MONTHS, THE FOOD YOU BOUGHT JUST DIDN'T LAST AND YOU DIDN'T HAVE MONEY TO GET MORE.: NEVER TRUE

## 2025-07-18 SDOH — ECONOMIC STABILITY: TRANSPORTATION INSECURITY
IN THE PAST 12 MONTHS, HAS THE LACK OF TRANSPORTATION KEPT YOU FROM MEDICAL APPOINTMENTS OR FROM GETTING MEDICATIONS?: NO

## 2025-07-18 SDOH — HEALTH STABILITY: PHYSICAL HEALTH: ON AVERAGE, HOW MANY DAYS PER WEEK DO YOU ENGAGE IN MODERATE TO STRENUOUS EXERCISE (LIKE A BRISK WALK)?: 2 DAYS

## 2025-07-18 SDOH — ECONOMIC STABILITY: TRANSPORTATION INSECURITY
IN THE PAST 12 MONTHS, HAS LACK OF TRANSPORTATION KEPT YOU FROM MEETINGS, WORK, OR FROM GETTING THINGS NEEDED FOR DAILY LIVING?: NO

## 2025-07-18 ASSESSMENT — LIFESTYLE VARIABLES
HOW OFTEN DO YOU HAVE A DRINK CONTAINING ALCOHOL: NEVER
HOW MANY STANDARD DRINKS CONTAINING ALCOHOL DO YOU HAVE ON A TYPICAL DAY: PATIENT DOES NOT DRINK

## 2025-07-18 ASSESSMENT — SOCIAL DETERMINANTS OF HEALTH (SDOH)
HOW OFTEN DO YOU ATTENT MEETINGS OF THE CLUB OR ORGANIZATION YOU BELONG TO?: NEVER
HOW OFTEN DO YOU GET TOGETHER WITH FRIENDS OR RELATIVES?: ONCE A WEEK
HOW OFTEN DO YOU ATTEND CHURCH OR RELIGIOUS SERVICES?: NEVER
HOW HARD IS IT FOR YOU TO PAY FOR THE VERY BASICS LIKE FOOD, HOUSING, MEDICAL CARE, AND HEATING?: NOT HARD AT ALL
IN A TYPICAL WEEK, HOW MANY TIMES DO YOU TALK ON THE PHONE WITH FAMILY, FRIENDS, OR NEIGHBORS?: TWICE A WEEK
DO YOU BELONG TO ANY CLUBS OR ORGANIZATIONS SUCH AS CHURCH GROUPS UNIONS, FRATERNAL OR ATHLETIC GROUPS, OR SCHOOL GROUPS?: NO

## 2025-07-18 NOTE — PROGRESS NOTES
Nurse Navigation outreach related to intake and SDOH    Navigation Intake 7/18/2025    I reviewed Oncology Care Team, patient's personal history of cancer, and Social Determinants of Health. The role of navigation services, how to communicate with office, pending appointments, and MyChart enrollment and use have been reviewed and updated. Referring provider notified of intake and upcoming appointments.    Current Urologist: Dr. Heredia  Appointment with Oncology: 7/21/25 with Dr. Lovett    My Chart message to patient with navigation contact information and upcoming appointments.  Attached link for calendar for Cancer Support in the Community provided by the [facility] with opportunities for programs both in person and virtually.  Attached link for the [facility] for counseling opportunities, support groups, financial assistance, and general physical support.    Navigation Assessment:  The following were identified as potential barriers to care:   1. None at this time.     Interventions and Plan:  Pt received his first lupron injection on 7/7/25 and reports that he has been experiencing significant hot flashes and night sweats that wake him up and require him to change clothes. Advised pt to communicate this with Dr. Lovett at his appt on 7/21/25.    Goal of next outreach: Med Onc follow up/plan of care  Time Spent: 20 minutes

## 2025-07-21 ENCOUNTER — OFFICE VISIT (OUTPATIENT)
Dept: ONCOLOGY | Age: 65
End: 2025-07-21
Payer: COMMERCIAL

## 2025-07-21 VITALS
HEART RATE: 94 BPM | TEMPERATURE: 96.7 F | OXYGEN SATURATION: 97 % | SYSTOLIC BLOOD PRESSURE: 151 MMHG | DIASTOLIC BLOOD PRESSURE: 102 MMHG | BODY MASS INDEX: 35.68 KG/M2 | RESPIRATION RATE: 16 BRPM | HEIGHT: 72 IN | WEIGHT: 263.4 LBS

## 2025-07-21 DIAGNOSIS — C79.51 METASTASIS TO BONE (HCC): Primary | ICD-10-CM

## 2025-07-21 DIAGNOSIS — C61 PROSTATE CANCER (HCC): ICD-10-CM

## 2025-07-21 PROCEDURE — 99205 OFFICE O/P NEW HI 60 MIN: CPT | Performed by: INTERNAL MEDICINE

## 2025-07-21 RX ORDER — ABIRATERONE ACETATE 250 MG/1
1000 TABLET ORAL DAILY
Qty: 120 TABLET | Refills: 5 | Status: ACTIVE | OUTPATIENT
Start: 2025-07-21

## 2025-07-21 RX ORDER — PREDNISONE 5 MG/1
5 TABLET ORAL DAILY
Qty: 30 TABLET | Refills: 5 | Status: SHIPPED | OUTPATIENT
Start: 2025-07-21 | End: 2026-01-17

## 2025-07-21 ASSESSMENT — PATIENT HEALTH QUESTIONNAIRE - PHQ9
2. FEELING DOWN, DEPRESSED OR HOPELESS: NOT AT ALL
SUM OF ALL RESPONSES TO PHQ QUESTIONS 1-9: 0
1. LITTLE INTEREST OR PLEASURE IN DOING THINGS: NOT AT ALL

## 2025-07-21 NOTE — PROGRESS NOTES
[facility]: Office Visit New Patient H & P    Chief Complaint:    Chief Complaint   Patient presents with    Prostate Cancer         History of Present Illness:  History of Present Illness  The patient is a 65-year-old male presenting as a new patient for management of prostate cancer. His past medical history includes stage IV chronic renal disease and congestive heart failure, currently well compensated. He has been monitored with serial prostate-specific antigen (PSA) levels, the most recent being 14.6 ng/mL. An MRI of the prostate in October 2022 identified a 2.9 cm PI-RADS 3 lesion. A subsequent MRI in April 2025 revealed this lesion to be more defined and measuring 14 mm in the right peripheral zone, consistent with PI-RADS 4 disease. A prostate biopsy performed on 05/13/2025 demonstrated one core of Nereida score 4+4=8 and additional cores with Leonard scores of 6 and 7. A PSMA PET scan on 06/04/2025 initially indicated localized disease, but tumor board review identified a focal area of intense uptake in the L5 vertebral body, highly suggestive of a solitary metastatic focus. Due to his comorbidities and the presence of oligometastatic disease, he was not considered a surgical candidate. Consultation with Dr. Vivar led to the recommendation of definitive radiation therapy to the primary lesion and stereotactic radiation to the single site of oligometastatic disease in the lumbar spine. Concurrent dual antiandrogen therapy was advised, and he presents to medical oncology for further discussion. He has received his first Lupron injection and is currently experiencing vasomotor symptoms. He underwent simulation on 07/10/2025 and has initiation of treatment with external beam radiotherapy tomorrow. The patient reports gastrointestinal symptoms, which he attributes to his medications. He is currently taking Linzess, resulting in bowel movements every few days.      Review of

## 2025-07-21 NOTE — PATIENT INSTRUCTIONS
Patient Information from Today's Visit    The members of your [facility] are listed below:    Physician Provider: Dr. Donte Lovett  Advanced Practice Clinician: Dariana Gonzalez  Registered Nurse: Yang DAVID   Nurse Navigator: Keshia VILLALOBOS RN  Medical Assistant: Radha \"Rossy\" VEE.   : Kyara \"Maura\" CRISTAL.   Supportive Care Services: JUANCARLOS Alonso    Diagnosis (Information Sheet Provided on Day of Diagnosis): Prostate Cancer    Follow Up Instructions:   6 weeks    We will order a medication called Zytiga. You can start this when you receive it. You will take decadron daily as well. See attached instructions on when to take the Zytiga (empty stomach).    Has Treatment Plan Been Finalized? N/A     Current Labs: No labs drawn today.      Please refer to After Visit Summary or [email or web address] for upcoming appointment information. Please call our office for rescheduling needs at least 24 hours before your scheduled appointment time.If you have any questions regarding your upcoming schedule please reach out to your care team through [email or web address] or call [phone].     Please notify your assigned Nurse Navigator of any unplanned hospital admissions or Emergency Room visits within 24 hours of discharge.    -------------------------------------------------------------------------------------------------------------------  Please call our office at [phone] if you have any  of the following symptoms:   Fever of 100.5 or greater  Chills  Shortness of breath  Swelling or pain in one leg    After office hours an answering service is available and will contact a provider for emergencies or if you are experiencing any of the above symptoms.        YANG BROWNLEE RN

## 2025-07-22 ENCOUNTER — HOSPITAL ENCOUNTER (OUTPATIENT)
Dept: RADIATION ONCOLOGY | Age: 65
Setting detail: RECURRING SERIES
Discharge: HOME | End: 2025-07-25
Payer: COMMERCIAL

## 2025-07-22 LAB
RAD ONC ARIA COURSE FIRST TREATMENT DATE: NORMAL
RAD ONC ARIA COURSE ID: NORMAL
RAD ONC ARIA COURSE INTENT: NORMAL
RAD ONC ARIA COURSE LAST TREATMENT DATE: NORMAL
RAD ONC ARIA COURSE SESSION NUMBER: 1
RAD ONC ARIA COURSE START DATE: NORMAL
RAD ONC ARIA COURSE TREATMENT ELAPSED DAYS: 0
RAD ONC ARIA PLAN FRACTIONS TREATED TO DATE: 1
RAD ONC ARIA PLAN ID: NORMAL
RAD ONC ARIA PLAN PRESCRIBED DOSE PER FRACTION: 2.7 GY
RAD ONC ARIA PLAN PRIMARY REFERENCE POINT: NORMAL
RAD ONC ARIA PLAN TOTAL FRACTIONS PRESCRIBED: 28
RAD ONC ARIA PLAN TOTAL PRESCRIBED DOSE: 7560 CGY
RAD ONC ARIA REFERENCE POINT DOSAGE GIVEN TO DATE: 2.7 GY
RAD ONC ARIA REFERENCE POINT ID: NORMAL
RAD ONC ARIA REFERENCE POINT SESSION DOSAGE GIVEN: 2.7 GY

## 2025-07-22 PROCEDURE — 77385 HC NTSTY MODUL RAD TX DLVR SMPL: CPT

## 2025-07-23 ENCOUNTER — HOSPITAL ENCOUNTER (OUTPATIENT)
Dept: RADIATION ONCOLOGY | Age: 65
Setting detail: RECURRING SERIES
Discharge: HOME | End: 2025-07-26
Payer: COMMERCIAL

## 2025-07-23 ENCOUNTER — CLINICAL DOCUMENTATION (OUTPATIENT)
Dept: CASE MANAGEMENT | Age: 65
End: 2025-07-23

## 2025-07-23 LAB
RAD ONC ARIA COURSE FIRST TREATMENT DATE: NORMAL
RAD ONC ARIA COURSE ID: NORMAL
RAD ONC ARIA COURSE INTENT: NORMAL
RAD ONC ARIA COURSE LAST TREATMENT DATE: NORMAL
RAD ONC ARIA COURSE SESSION NUMBER: 2
RAD ONC ARIA COURSE START DATE: NORMAL
RAD ONC ARIA COURSE TREATMENT ELAPSED DAYS: 1
RAD ONC ARIA PLAN FRACTIONS TREATED TO DATE: 2
RAD ONC ARIA PLAN ID: NORMAL
RAD ONC ARIA PLAN PRESCRIBED DOSE PER FRACTION: 2.7 GY
RAD ONC ARIA PLAN PRIMARY REFERENCE POINT: NORMAL
RAD ONC ARIA PLAN TOTAL FRACTIONS PRESCRIBED: 28
RAD ONC ARIA PLAN TOTAL PRESCRIBED DOSE: 7560 CGY
RAD ONC ARIA REFERENCE POINT DOSAGE GIVEN TO DATE: 5.4 GY
RAD ONC ARIA REFERENCE POINT ID: NORMAL
RAD ONC ARIA REFERENCE POINT SESSION DOSAGE GIVEN: 2.7 GY

## 2025-07-23 PROCEDURE — 77385 HC NTSTY MODUL RAD TX DLVR SMPL: CPT

## 2025-07-23 NOTE — PROGRESS NOTES
Nurse Navigation outreach related to medical oncology consult visit    Nurse navigation reviewed medication eduction. Oncology Medical Home information and education on how to contact our office was provided via verbally.    Navigation Assessment:  The following were identified as potential barriers to care:   1. None at this time.     Interventions and Plan:  Reviewed medication administration instructions for abiraterone/prednisone. Pt states that his abiraterone is scheduled to be delivered on Monday 7/28/25. He anticipates starting it on the morning of 7/29/25 but will send a message to confirm his start date.    Goal of next outreach: Status check  Time Spent: 10 minutes

## 2025-07-24 ENCOUNTER — HOSPITAL ENCOUNTER (OUTPATIENT)
Dept: RADIATION ONCOLOGY | Age: 65
Setting detail: RECURRING SERIES
Discharge: HOME | End: 2025-07-27
Payer: COMMERCIAL

## 2025-07-24 LAB
RAD ONC ARIA COURSE FIRST TREATMENT DATE: NORMAL
RAD ONC ARIA COURSE ID: NORMAL
RAD ONC ARIA COURSE INTENT: NORMAL
RAD ONC ARIA COURSE LAST TREATMENT DATE: NORMAL
RAD ONC ARIA COURSE SESSION NUMBER: 3
RAD ONC ARIA COURSE START DATE: NORMAL
RAD ONC ARIA COURSE TREATMENT ELAPSED DAYS: 2
RAD ONC ARIA PLAN FRACTIONS TREATED TO DATE: 3
RAD ONC ARIA PLAN ID: NORMAL
RAD ONC ARIA PLAN PRESCRIBED DOSE PER FRACTION: 2.7 GY
RAD ONC ARIA PLAN PRIMARY REFERENCE POINT: NORMAL
RAD ONC ARIA PLAN TOTAL FRACTIONS PRESCRIBED: 28
RAD ONC ARIA PLAN TOTAL PRESCRIBED DOSE: 7560 CGY
RAD ONC ARIA REFERENCE POINT DOSAGE GIVEN TO DATE: 8.1 GY
RAD ONC ARIA REFERENCE POINT ID: NORMAL
RAD ONC ARIA REFERENCE POINT SESSION DOSAGE GIVEN: 2.7 GY

## 2025-07-24 PROCEDURE — 77385 HC NTSTY MODUL RAD TX DLVR SMPL: CPT

## 2025-07-25 ENCOUNTER — HOSPITAL ENCOUNTER (OUTPATIENT)
Dept: RADIATION ONCOLOGY | Age: 65
Setting detail: RECURRING SERIES
End: 2025-07-25
Payer: COMMERCIAL

## 2025-07-28 ENCOUNTER — HOSPITAL ENCOUNTER (OUTPATIENT)
Dept: RADIATION ONCOLOGY | Age: 65
Setting detail: RECURRING SERIES
Discharge: HOME | End: 2025-07-31
Payer: COMMERCIAL

## 2025-07-28 LAB
RAD ONC ARIA COURSE FIRST TREATMENT DATE: NORMAL
RAD ONC ARIA COURSE ID: NORMAL
RAD ONC ARIA COURSE INTENT: NORMAL
RAD ONC ARIA COURSE LAST TREATMENT DATE: NORMAL
RAD ONC ARIA COURSE SESSION NUMBER: 4
RAD ONC ARIA COURSE START DATE: NORMAL
RAD ONC ARIA COURSE TREATMENT ELAPSED DAYS: 6
RAD ONC ARIA PLAN FRACTIONS TREATED TO DATE: 4
RAD ONC ARIA PLAN ID: NORMAL
RAD ONC ARIA PLAN PRESCRIBED DOSE PER FRACTION: 2.7 GY
RAD ONC ARIA PLAN PRIMARY REFERENCE POINT: NORMAL
RAD ONC ARIA PLAN TOTAL FRACTIONS PRESCRIBED: 28
RAD ONC ARIA PLAN TOTAL PRESCRIBED DOSE: 7560 CGY
RAD ONC ARIA REFERENCE POINT DOSAGE GIVEN TO DATE: 10.8 GY
RAD ONC ARIA REFERENCE POINT ID: NORMAL
RAD ONC ARIA REFERENCE POINT SESSION DOSAGE GIVEN: 2.7 GY

## 2025-07-28 PROCEDURE — 77385 HC NTSTY MODUL RAD TX DLVR SMPL: CPT

## 2025-07-29 ENCOUNTER — HOSPITAL ENCOUNTER (OUTPATIENT)
Dept: RADIATION ONCOLOGY | Age: 65
Setting detail: RECURRING SERIES
Discharge: HOME | End: 2025-08-01
Payer: COMMERCIAL

## 2025-07-29 LAB
RAD ONC ARIA COURSE FIRST TREATMENT DATE: NORMAL
RAD ONC ARIA COURSE ID: NORMAL
RAD ONC ARIA COURSE INTENT: NORMAL
RAD ONC ARIA COURSE LAST TREATMENT DATE: NORMAL
RAD ONC ARIA COURSE SESSION NUMBER: 5
RAD ONC ARIA COURSE START DATE: NORMAL
RAD ONC ARIA COURSE TREATMENT ELAPSED DAYS: 7
RAD ONC ARIA PLAN FRACTIONS TREATED TO DATE: 5
RAD ONC ARIA PLAN ID: NORMAL
RAD ONC ARIA PLAN PRESCRIBED DOSE PER FRACTION: 2.7 GY
RAD ONC ARIA PLAN PRIMARY REFERENCE POINT: NORMAL
RAD ONC ARIA PLAN TOTAL FRACTIONS PRESCRIBED: 28
RAD ONC ARIA PLAN TOTAL PRESCRIBED DOSE: 7560 CGY
RAD ONC ARIA REFERENCE POINT DOSAGE GIVEN TO DATE: 13.5 GY
RAD ONC ARIA REFERENCE POINT ID: NORMAL
RAD ONC ARIA REFERENCE POINT SESSION DOSAGE GIVEN: 2.7 GY

## 2025-07-29 PROCEDURE — 77385 HC NTSTY MODUL RAD TX DLVR SMPL: CPT

## 2025-07-30 ENCOUNTER — HOSPITAL ENCOUNTER (OUTPATIENT)
Dept: RADIATION ONCOLOGY | Age: 65
Setting detail: RECURRING SERIES
Discharge: HOME | End: 2025-08-02
Payer: COMMERCIAL

## 2025-07-30 LAB
RAD ONC ARIA COURSE FIRST TREATMENT DATE: NORMAL
RAD ONC ARIA COURSE ID: NORMAL
RAD ONC ARIA COURSE INTENT: NORMAL
RAD ONC ARIA COURSE LAST TREATMENT DATE: NORMAL
RAD ONC ARIA COURSE SESSION NUMBER: 6
RAD ONC ARIA COURSE START DATE: NORMAL
RAD ONC ARIA COURSE TREATMENT ELAPSED DAYS: 8
RAD ONC ARIA PLAN FRACTIONS TREATED TO DATE: 6
RAD ONC ARIA PLAN ID: NORMAL
RAD ONC ARIA PLAN PRESCRIBED DOSE PER FRACTION: 2.7 GY
RAD ONC ARIA PLAN PRIMARY REFERENCE POINT: NORMAL
RAD ONC ARIA PLAN TOTAL FRACTIONS PRESCRIBED: 28
RAD ONC ARIA PLAN TOTAL PRESCRIBED DOSE: 7560 CGY
RAD ONC ARIA REFERENCE POINT DOSAGE GIVEN TO DATE: 16.2 GY
RAD ONC ARIA REFERENCE POINT ID: NORMAL
RAD ONC ARIA REFERENCE POINT SESSION DOSAGE GIVEN: 2.7 GY

## 2025-07-30 PROCEDURE — 77336 RADIATION PHYSICS CONSULT: CPT

## 2025-07-30 PROCEDURE — 77385 HC NTSTY MODUL RAD TX DLVR SMPL: CPT

## 2025-07-30 NOTE — ON TREATMENT VISIT
[facility] RADIATION ONCOLOGY ON TREATMENT VISIT    Patient: Arvind Rodas MRN: [id number]  SSN: [id number]    YOB: 1960  Age: 65 y.o.  Sex: male      07/30/25    Diagnosis:  Cancer Staging   No matching staging information was found for the patient.      This is a 65 y.o. male who is currently receiving moderate hypofractionation for oligometastatic prostate cancer.    Current RT dose: 12.5/70 Gy in 5/28 fractions -- seen pretreatment today as he was not able to be treated last Friday so wasn't seen for formal OTV.    Concurrent ADT: Lupron    Subjective:  Week 1: Trouble with getting bowels clear for treatment.    Objective:  There were no vitals filed for this visit.  Pain 0/10    General: Alert and conversant, in NAD  Skin: No changes    Assessment:  Patient is tolerating radiation therapy well with no XRT issues.    Plan:  -Discussed bowel regimen with mag citrate or enemas as needed as standard bowel regimen was not sufficient.  -Continue RT as planned.  -Treatment images reviewed.  -The patient has a documented plan of care to address pain.  Pain is not present.      Alexey Vivar MD  07/30/25

## 2025-07-31 ENCOUNTER — HOSPITAL ENCOUNTER (OUTPATIENT)
Dept: RADIATION ONCOLOGY | Age: 65
Setting detail: RECURRING SERIES
End: 2025-07-31
Payer: COMMERCIAL

## 2025-07-31 LAB
RAD ONC ARIA COURSE FIRST TREATMENT DATE: NORMAL
RAD ONC ARIA COURSE ID: NORMAL
RAD ONC ARIA COURSE INTENT: NORMAL
RAD ONC ARIA COURSE LAST TREATMENT DATE: NORMAL
RAD ONC ARIA COURSE SESSION NUMBER: 7
RAD ONC ARIA COURSE START DATE: NORMAL
RAD ONC ARIA COURSE TREATMENT ELAPSED DAYS: 9
RAD ONC ARIA PLAN FRACTIONS TREATED TO DATE: 7
RAD ONC ARIA PLAN ID: NORMAL
RAD ONC ARIA PLAN PRESCRIBED DOSE PER FRACTION: 2.7 GY
RAD ONC ARIA PLAN PRIMARY REFERENCE POINT: NORMAL
RAD ONC ARIA PLAN TOTAL FRACTIONS PRESCRIBED: 28
RAD ONC ARIA PLAN TOTAL PRESCRIBED DOSE: 7560 CGY
RAD ONC ARIA REFERENCE POINT DOSAGE GIVEN TO DATE: 18.9 GY
RAD ONC ARIA REFERENCE POINT ID: NORMAL
RAD ONC ARIA REFERENCE POINT SESSION DOSAGE GIVEN: 2.7 GY

## 2025-07-31 PROCEDURE — 77385 HC NTSTY MODUL RAD TX DLVR SMPL: CPT

## 2025-08-01 ENCOUNTER — HOSPITAL ENCOUNTER (OUTPATIENT)
Dept: RADIATION ONCOLOGY | Age: 65
Setting detail: RECURRING SERIES
Discharge: HOME | End: 2025-08-04
Payer: COMMERCIAL

## 2025-08-01 DIAGNOSIS — C79.51 METASTASIS TO BONE (HCC): ICD-10-CM

## 2025-08-01 DIAGNOSIS — C61 PROSTATE CANCER (HCC): Primary | ICD-10-CM

## 2025-08-01 LAB
RAD ONC ARIA COURSE FIRST TREATMENT DATE: NORMAL
RAD ONC ARIA COURSE ID: NORMAL
RAD ONC ARIA COURSE INTENT: NORMAL
RAD ONC ARIA COURSE LAST TREATMENT DATE: NORMAL
RAD ONC ARIA COURSE SESSION NUMBER: 8
RAD ONC ARIA COURSE START DATE: NORMAL
RAD ONC ARIA COURSE TREATMENT ELAPSED DAYS: 10
RAD ONC ARIA PLAN FRACTIONS TREATED TO DATE: 8
RAD ONC ARIA PLAN ID: NORMAL
RAD ONC ARIA PLAN PRESCRIBED DOSE PER FRACTION: 2.7 GY
RAD ONC ARIA PLAN PRIMARY REFERENCE POINT: NORMAL
RAD ONC ARIA PLAN TOTAL FRACTIONS PRESCRIBED: 28
RAD ONC ARIA PLAN TOTAL PRESCRIBED DOSE: 7560 CGY
RAD ONC ARIA REFERENCE POINT DOSAGE GIVEN TO DATE: 21.6 GY
RAD ONC ARIA REFERENCE POINT ID: NORMAL
RAD ONC ARIA REFERENCE POINT SESSION DOSAGE GIVEN: 2.7 GY

## 2025-08-01 PROCEDURE — 77385 HC NTSTY MODUL RAD TX DLVR SMPL: CPT

## 2025-08-04 ENCOUNTER — HOSPITAL ENCOUNTER (OUTPATIENT)
Dept: RADIATION ONCOLOGY | Age: 65
Setting detail: RECURRING SERIES
Discharge: HOME | End: 2025-08-07
Payer: COMMERCIAL

## 2025-08-04 LAB
RAD ONC ARIA COURSE FIRST TREATMENT DATE: NORMAL
RAD ONC ARIA COURSE ID: NORMAL
RAD ONC ARIA COURSE INTENT: NORMAL
RAD ONC ARIA COURSE LAST TREATMENT DATE: NORMAL
RAD ONC ARIA COURSE SESSION NUMBER: 9
RAD ONC ARIA COURSE START DATE: NORMAL
RAD ONC ARIA COURSE TREATMENT ELAPSED DAYS: 13
RAD ONC ARIA PLAN FRACTIONS TREATED TO DATE: 9
RAD ONC ARIA PLAN ID: NORMAL
RAD ONC ARIA PLAN PRESCRIBED DOSE PER FRACTION: 2.7 GY
RAD ONC ARIA PLAN PRIMARY REFERENCE POINT: NORMAL
RAD ONC ARIA PLAN TOTAL FRACTIONS PRESCRIBED: 28
RAD ONC ARIA PLAN TOTAL PRESCRIBED DOSE: 7560 CGY
RAD ONC ARIA REFERENCE POINT DOSAGE GIVEN TO DATE: 24.3 GY
RAD ONC ARIA REFERENCE POINT ID: NORMAL
RAD ONC ARIA REFERENCE POINT SESSION DOSAGE GIVEN: 2.7 GY

## 2025-08-04 PROCEDURE — 77385 HC NTSTY MODUL RAD TX DLVR SMPL: CPT

## 2025-08-05 ENCOUNTER — HOSPITAL ENCOUNTER (OUTPATIENT)
Dept: RADIATION ONCOLOGY | Age: 65
Setting detail: RECURRING SERIES
Discharge: HOME | End: 2025-08-08
Payer: COMMERCIAL

## 2025-08-05 LAB
RAD ONC ARIA COURSE FIRST TREATMENT DATE: NORMAL
RAD ONC ARIA COURSE ID: NORMAL
RAD ONC ARIA COURSE INTENT: NORMAL
RAD ONC ARIA COURSE LAST TREATMENT DATE: NORMAL
RAD ONC ARIA COURSE SESSION NUMBER: 10
RAD ONC ARIA COURSE START DATE: NORMAL
RAD ONC ARIA COURSE TREATMENT ELAPSED DAYS: 14
RAD ONC ARIA PLAN FRACTIONS TREATED TO DATE: 10
RAD ONC ARIA PLAN ID: NORMAL
RAD ONC ARIA PLAN PRESCRIBED DOSE PER FRACTION: 2.7 GY
RAD ONC ARIA PLAN PRIMARY REFERENCE POINT: NORMAL
RAD ONC ARIA PLAN TOTAL FRACTIONS PRESCRIBED: 28
RAD ONC ARIA PLAN TOTAL PRESCRIBED DOSE: 7560 CGY
RAD ONC ARIA REFERENCE POINT DOSAGE GIVEN TO DATE: 27 GY
RAD ONC ARIA REFERENCE POINT ID: NORMAL
RAD ONC ARIA REFERENCE POINT SESSION DOSAGE GIVEN: 2.7 GY

## 2025-08-05 PROCEDURE — 77336 RADIATION PHYSICS CONSULT: CPT

## 2025-08-05 PROCEDURE — 77385 HC NTSTY MODUL RAD TX DLVR SMPL: CPT

## 2025-08-06 ENCOUNTER — TELEPHONE (OUTPATIENT)
Dept: RADIATION ONCOLOGY | Age: 65
End: 2025-08-06

## 2025-08-06 ENCOUNTER — HOSPITAL ENCOUNTER (OUTPATIENT)
Dept: RADIATION ONCOLOGY | Age: 65
Setting detail: RECURRING SERIES
Discharge: HOME | End: 2025-08-09
Payer: COMMERCIAL

## 2025-08-06 DIAGNOSIS — C61 PROSTATE CANCER (HCC): Primary | ICD-10-CM

## 2025-08-06 LAB
RAD ONC ARIA COURSE FIRST TREATMENT DATE: NORMAL
RAD ONC ARIA COURSE ID: NORMAL
RAD ONC ARIA COURSE INTENT: NORMAL
RAD ONC ARIA COURSE LAST TREATMENT DATE: NORMAL
RAD ONC ARIA COURSE SESSION NUMBER: 11
RAD ONC ARIA COURSE START DATE: NORMAL
RAD ONC ARIA COURSE TREATMENT ELAPSED DAYS: 15
RAD ONC ARIA PLAN FRACTIONS TREATED TO DATE: 11
RAD ONC ARIA PLAN ID: NORMAL
RAD ONC ARIA PLAN PRESCRIBED DOSE PER FRACTION: 2.7 GY
RAD ONC ARIA PLAN PRIMARY REFERENCE POINT: NORMAL
RAD ONC ARIA PLAN TOTAL FRACTIONS PRESCRIBED: 28
RAD ONC ARIA PLAN TOTAL PRESCRIBED DOSE: 7560 CGY
RAD ONC ARIA REFERENCE POINT DOSAGE GIVEN TO DATE: 29.7 GY
RAD ONC ARIA REFERENCE POINT ID: NORMAL
RAD ONC ARIA REFERENCE POINT SESSION DOSAGE GIVEN: 2.7 GY

## 2025-08-06 PROCEDURE — 77385 HC NTSTY MODUL RAD TX DLVR SMPL: CPT

## 2025-08-06 RX ORDER — TAMSULOSIN HYDROCHLORIDE 0.4 MG/1
0.4 CAPSULE ORAL NIGHTLY
Qty: 30 CAPSULE | Refills: 1 | Status: SHIPPED | OUTPATIENT
Start: 2025-08-06

## 2025-08-07 ENCOUNTER — HOSPITAL ENCOUNTER (OUTPATIENT)
Dept: RADIATION ONCOLOGY | Age: 65
Setting detail: RECURRING SERIES
Discharge: HOME | End: 2025-08-10
Payer: COMMERCIAL

## 2025-08-07 LAB
RAD ONC ARIA COURSE FIRST TREATMENT DATE: NORMAL
RAD ONC ARIA COURSE ID: NORMAL
RAD ONC ARIA COURSE INTENT: NORMAL
RAD ONC ARIA COURSE LAST TREATMENT DATE: NORMAL
RAD ONC ARIA COURSE SESSION NUMBER: 12
RAD ONC ARIA COURSE START DATE: NORMAL
RAD ONC ARIA COURSE TREATMENT ELAPSED DAYS: 16
RAD ONC ARIA PLAN FRACTIONS TREATED TO DATE: 12
RAD ONC ARIA PLAN ID: NORMAL
RAD ONC ARIA PLAN PRESCRIBED DOSE PER FRACTION: 2.7 GY
RAD ONC ARIA PLAN PRIMARY REFERENCE POINT: NORMAL
RAD ONC ARIA PLAN TOTAL FRACTIONS PRESCRIBED: 28
RAD ONC ARIA PLAN TOTAL PRESCRIBED DOSE: 7560 CGY
RAD ONC ARIA REFERENCE POINT DOSAGE GIVEN TO DATE: 32.4 GY
RAD ONC ARIA REFERENCE POINT ID: NORMAL
RAD ONC ARIA REFERENCE POINT SESSION DOSAGE GIVEN: 2.7 GY

## 2025-08-07 PROCEDURE — 77385 HC NTSTY MODUL RAD TX DLVR SMPL: CPT

## 2025-08-08 ENCOUNTER — HOSPITAL ENCOUNTER (OUTPATIENT)
Dept: RADIATION ONCOLOGY | Age: 65
Setting detail: RECURRING SERIES
Discharge: HOME | End: 2025-08-11
Payer: COMMERCIAL

## 2025-08-08 LAB
RAD ONC ARIA COURSE FIRST TREATMENT DATE: NORMAL
RAD ONC ARIA COURSE ID: NORMAL
RAD ONC ARIA COURSE INTENT: NORMAL
RAD ONC ARIA COURSE LAST TREATMENT DATE: NORMAL
RAD ONC ARIA COURSE SESSION NUMBER: 13
RAD ONC ARIA COURSE START DATE: NORMAL
RAD ONC ARIA COURSE TREATMENT ELAPSED DAYS: 17
RAD ONC ARIA PLAN FRACTIONS TREATED TO DATE: 13
RAD ONC ARIA PLAN ID: NORMAL
RAD ONC ARIA PLAN PRESCRIBED DOSE PER FRACTION: 2.7 GY
RAD ONC ARIA PLAN PRIMARY REFERENCE POINT: NORMAL
RAD ONC ARIA PLAN TOTAL FRACTIONS PRESCRIBED: 28
RAD ONC ARIA PLAN TOTAL PRESCRIBED DOSE: 7560 CGY
RAD ONC ARIA REFERENCE POINT DOSAGE GIVEN TO DATE: 35.1 GY
RAD ONC ARIA REFERENCE POINT ID: NORMAL
RAD ONC ARIA REFERENCE POINT SESSION DOSAGE GIVEN: 2.7 GY

## 2025-08-08 PROCEDURE — 77385 HC NTSTY MODUL RAD TX DLVR SMPL: CPT

## 2025-08-11 ENCOUNTER — HOSPITAL ENCOUNTER (OUTPATIENT)
Dept: RADIATION ONCOLOGY | Age: 65
Setting detail: RECURRING SERIES
Discharge: HOME | End: 2025-08-14
Payer: COMMERCIAL

## 2025-08-11 LAB
RAD ONC ARIA COURSE FIRST TREATMENT DATE: NORMAL
RAD ONC ARIA COURSE ID: NORMAL
RAD ONC ARIA COURSE INTENT: NORMAL
RAD ONC ARIA COURSE LAST TREATMENT DATE: NORMAL
RAD ONC ARIA COURSE SESSION NUMBER: 14
RAD ONC ARIA COURSE START DATE: NORMAL
RAD ONC ARIA COURSE TREATMENT ELAPSED DAYS: 20
RAD ONC ARIA PLAN FRACTIONS TREATED TO DATE: 14
RAD ONC ARIA PLAN ID: NORMAL
RAD ONC ARIA PLAN PRESCRIBED DOSE PER FRACTION: 2.7 GY
RAD ONC ARIA PLAN PRIMARY REFERENCE POINT: NORMAL
RAD ONC ARIA PLAN TOTAL FRACTIONS PRESCRIBED: 28
RAD ONC ARIA PLAN TOTAL PRESCRIBED DOSE: 7560 CGY
RAD ONC ARIA REFERENCE POINT DOSAGE GIVEN TO DATE: 37.8 GY
RAD ONC ARIA REFERENCE POINT ID: NORMAL
RAD ONC ARIA REFERENCE POINT SESSION DOSAGE GIVEN: 2.7 GY

## 2025-08-11 PROCEDURE — 77385 HC NTSTY MODUL RAD TX DLVR SMPL: CPT

## 2025-08-12 ENCOUNTER — HOSPITAL ENCOUNTER (OUTPATIENT)
Dept: RADIATION ONCOLOGY | Age: 65
Setting detail: RECURRING SERIES
Discharge: HOME | End: 2025-08-15
Payer: COMMERCIAL

## 2025-08-12 LAB
RAD ONC ARIA COURSE FIRST TREATMENT DATE: NORMAL
RAD ONC ARIA COURSE ID: NORMAL
RAD ONC ARIA COURSE INTENT: NORMAL
RAD ONC ARIA COURSE LAST TREATMENT DATE: NORMAL
RAD ONC ARIA COURSE SESSION NUMBER: 15
RAD ONC ARIA COURSE START DATE: NORMAL
RAD ONC ARIA COURSE TREATMENT ELAPSED DAYS: 21
RAD ONC ARIA PLAN FRACTIONS TREATED TO DATE: 15
RAD ONC ARIA PLAN ID: NORMAL
RAD ONC ARIA PLAN PRESCRIBED DOSE PER FRACTION: 2.7 GY
RAD ONC ARIA PLAN PRIMARY REFERENCE POINT: NORMAL
RAD ONC ARIA PLAN TOTAL FRACTIONS PRESCRIBED: 28
RAD ONC ARIA PLAN TOTAL PRESCRIBED DOSE: 7560 CGY
RAD ONC ARIA REFERENCE POINT DOSAGE GIVEN TO DATE: 40.5 GY
RAD ONC ARIA REFERENCE POINT ID: NORMAL
RAD ONC ARIA REFERENCE POINT SESSION DOSAGE GIVEN: 2.7 GY

## 2025-08-12 PROCEDURE — 77385 HC NTSTY MODUL RAD TX DLVR SMPL: CPT

## 2025-08-13 ENCOUNTER — HOSPITAL ENCOUNTER (OUTPATIENT)
Dept: RADIATION ONCOLOGY | Age: 65
Setting detail: RECURRING SERIES
Discharge: HOME | End: 2025-08-16
Payer: COMMERCIAL

## 2025-08-13 LAB
RAD ONC ARIA COURSE FIRST TREATMENT DATE: NORMAL
RAD ONC ARIA COURSE ID: NORMAL
RAD ONC ARIA COURSE INTENT: NORMAL
RAD ONC ARIA COURSE LAST TREATMENT DATE: NORMAL
RAD ONC ARIA COURSE SESSION NUMBER: 16
RAD ONC ARIA COURSE START DATE: NORMAL
RAD ONC ARIA COURSE TREATMENT ELAPSED DAYS: 22
RAD ONC ARIA PLAN FRACTIONS TREATED TO DATE: 16
RAD ONC ARIA PLAN ID: NORMAL
RAD ONC ARIA PLAN PRESCRIBED DOSE PER FRACTION: 2.7 GY
RAD ONC ARIA PLAN PRIMARY REFERENCE POINT: NORMAL
RAD ONC ARIA PLAN TOTAL FRACTIONS PRESCRIBED: 28
RAD ONC ARIA PLAN TOTAL PRESCRIBED DOSE: 7560 CGY
RAD ONC ARIA REFERENCE POINT DOSAGE GIVEN TO DATE: 43.2 GY
RAD ONC ARIA REFERENCE POINT ID: NORMAL
RAD ONC ARIA REFERENCE POINT SESSION DOSAGE GIVEN: 2.7 GY

## 2025-08-13 PROCEDURE — 77385 HC NTSTY MODUL RAD TX DLVR SMPL: CPT

## 2025-08-13 PROCEDURE — 77336 RADIATION PHYSICS CONSULT: CPT

## 2025-08-14 ENCOUNTER — HOSPITAL ENCOUNTER (OUTPATIENT)
Dept: RADIATION ONCOLOGY | Age: 65
Setting detail: RECURRING SERIES
End: 2025-08-14
Payer: COMMERCIAL

## 2025-08-14 ENCOUNTER — OFFICE VISIT (OUTPATIENT)
Dept: ONCOLOGY | Age: 65
End: 2025-08-14

## 2025-08-14 DIAGNOSIS — C61 PROSTATE CANCER (HCC): Primary | ICD-10-CM

## 2025-08-15 ENCOUNTER — HOSPITAL ENCOUNTER (OUTPATIENT)
Dept: RADIATION ONCOLOGY | Age: 65
Setting detail: RECURRING SERIES
Discharge: HOME | End: 2025-08-18
Payer: COMMERCIAL

## 2025-08-15 LAB
CARIS ANDROGEN RECEPTOR: POSITIVE
CARIS GENOMIC LOH - EXOME: NORMAL
CARIS HLA-A: NORMAL
CARIS HLA-B: NORMAL
CARIS HLA-C: NORMAL
CARIS MSI - EXOME: NORMAL
CARIS PD-L1 (SP142): NEGATIVE
CARIS TMB - EXOME: NORMAL
RAD ONC ARIA COURSE FIRST TREATMENT DATE: NORMAL
RAD ONC ARIA COURSE ID: NORMAL
RAD ONC ARIA COURSE INTENT: NORMAL
RAD ONC ARIA COURSE LAST TREATMENT DATE: NORMAL
RAD ONC ARIA COURSE SESSION NUMBER: 17
RAD ONC ARIA COURSE START DATE: NORMAL
RAD ONC ARIA COURSE TREATMENT ELAPSED DAYS: 24
RAD ONC ARIA PLAN FRACTIONS TREATED TO DATE: 17
RAD ONC ARIA PLAN ID: NORMAL
RAD ONC ARIA PLAN PRESCRIBED DOSE PER FRACTION: 2.7 GY
RAD ONC ARIA PLAN PRIMARY REFERENCE POINT: NORMAL
RAD ONC ARIA PLAN TOTAL FRACTIONS PRESCRIBED: 28
RAD ONC ARIA PLAN TOTAL PRESCRIBED DOSE: 7560 CGY
RAD ONC ARIA REFERENCE POINT DOSAGE GIVEN TO DATE: 45.9 GY
RAD ONC ARIA REFERENCE POINT ID: NORMAL
RAD ONC ARIA REFERENCE POINT SESSION DOSAGE GIVEN: 2.7 GY

## 2025-08-15 PROCEDURE — 77385 HC NTSTY MODUL RAD TX DLVR SMPL: CPT

## 2025-08-18 ENCOUNTER — HOSPITAL ENCOUNTER (OUTPATIENT)
Dept: RADIATION ONCOLOGY | Age: 65
Setting detail: RECURRING SERIES
Discharge: HOME | End: 2025-08-21
Payer: COMMERCIAL

## 2025-08-18 LAB
RAD ONC ARIA COURSE FIRST TREATMENT DATE: NORMAL
RAD ONC ARIA COURSE ID: NORMAL
RAD ONC ARIA COURSE INTENT: NORMAL
RAD ONC ARIA COURSE LAST TREATMENT DATE: NORMAL
RAD ONC ARIA COURSE SESSION NUMBER: 18
RAD ONC ARIA COURSE START DATE: NORMAL
RAD ONC ARIA COURSE TREATMENT ELAPSED DAYS: 27
RAD ONC ARIA PLAN FRACTIONS TREATED TO DATE: 18
RAD ONC ARIA PLAN ID: NORMAL
RAD ONC ARIA PLAN PRESCRIBED DOSE PER FRACTION: 2.7 GY
RAD ONC ARIA PLAN PRIMARY REFERENCE POINT: NORMAL
RAD ONC ARIA PLAN TOTAL FRACTIONS PRESCRIBED: 28
RAD ONC ARIA PLAN TOTAL PRESCRIBED DOSE: 7560 CGY
RAD ONC ARIA REFERENCE POINT DOSAGE GIVEN TO DATE: 48.6 GY
RAD ONC ARIA REFERENCE POINT ID: NORMAL
RAD ONC ARIA REFERENCE POINT SESSION DOSAGE GIVEN: 2.7 GY

## 2025-08-18 PROCEDURE — 77385 HC NTSTY MODUL RAD TX DLVR SMPL: CPT

## 2025-08-19 ENCOUNTER — HOSPITAL ENCOUNTER (OUTPATIENT)
Dept: RADIATION ONCOLOGY | Age: 65
Setting detail: RECURRING SERIES
Discharge: HOME | End: 2025-08-22
Payer: COMMERCIAL

## 2025-08-19 LAB
RAD ONC ARIA COURSE FIRST TREATMENT DATE: NORMAL
RAD ONC ARIA COURSE ID: NORMAL
RAD ONC ARIA COURSE INTENT: NORMAL
RAD ONC ARIA COURSE LAST TREATMENT DATE: NORMAL
RAD ONC ARIA COURSE SESSION NUMBER: 19
RAD ONC ARIA COURSE START DATE: NORMAL
RAD ONC ARIA COURSE TREATMENT ELAPSED DAYS: 28
RAD ONC ARIA PLAN FRACTIONS TREATED TO DATE: 19
RAD ONC ARIA PLAN ID: NORMAL
RAD ONC ARIA PLAN PRESCRIBED DOSE PER FRACTION: 2.7 GY
RAD ONC ARIA PLAN PRIMARY REFERENCE POINT: NORMAL
RAD ONC ARIA PLAN TOTAL FRACTIONS PRESCRIBED: 28
RAD ONC ARIA PLAN TOTAL PRESCRIBED DOSE: 7560 CGY
RAD ONC ARIA REFERENCE POINT DOSAGE GIVEN TO DATE: 51.3 GY
RAD ONC ARIA REFERENCE POINT ID: NORMAL
RAD ONC ARIA REFERENCE POINT SESSION DOSAGE GIVEN: 2.7 GY

## 2025-08-19 PROCEDURE — 77385 HC NTSTY MODUL RAD TX DLVR SMPL: CPT

## 2025-08-20 ENCOUNTER — HOSPITAL ENCOUNTER (OUTPATIENT)
Dept: RADIATION ONCOLOGY | Age: 65
Setting detail: RECURRING SERIES
Discharge: HOME | End: 2025-08-23
Payer: COMMERCIAL

## 2025-08-20 LAB
RAD ONC ARIA COURSE FIRST TREATMENT DATE: NORMAL
RAD ONC ARIA COURSE ID: NORMAL
RAD ONC ARIA COURSE INTENT: NORMAL
RAD ONC ARIA COURSE LAST TREATMENT DATE: NORMAL
RAD ONC ARIA COURSE SESSION NUMBER: 20
RAD ONC ARIA COURSE START DATE: NORMAL
RAD ONC ARIA COURSE TREATMENT ELAPSED DAYS: 29
RAD ONC ARIA PLAN FRACTIONS TREATED TO DATE: 20
RAD ONC ARIA PLAN ID: NORMAL
RAD ONC ARIA PLAN PRESCRIBED DOSE PER FRACTION: 2.7 GY
RAD ONC ARIA PLAN PRIMARY REFERENCE POINT: NORMAL
RAD ONC ARIA PLAN TOTAL FRACTIONS PRESCRIBED: 28
RAD ONC ARIA PLAN TOTAL PRESCRIBED DOSE: 7560 CGY
RAD ONC ARIA REFERENCE POINT DOSAGE GIVEN TO DATE: 54 GY
RAD ONC ARIA REFERENCE POINT ID: NORMAL
RAD ONC ARIA REFERENCE POINT SESSION DOSAGE GIVEN: 2.7 GY

## 2025-08-20 PROCEDURE — 77385 HC NTSTY MODUL RAD TX DLVR SMPL: CPT

## 2025-08-21 ENCOUNTER — HOSPITAL ENCOUNTER (OUTPATIENT)
Dept: RADIATION ONCOLOGY | Age: 65
Setting detail: RECURRING SERIES
Discharge: HOME | End: 2025-08-24
Payer: COMMERCIAL

## 2025-08-21 LAB
RAD ONC ARIA COURSE FIRST TREATMENT DATE: NORMAL
RAD ONC ARIA COURSE ID: NORMAL
RAD ONC ARIA COURSE INTENT: NORMAL
RAD ONC ARIA COURSE LAST TREATMENT DATE: NORMAL
RAD ONC ARIA COURSE SESSION NUMBER: 21
RAD ONC ARIA COURSE START DATE: NORMAL
RAD ONC ARIA COURSE TREATMENT ELAPSED DAYS: 30
RAD ONC ARIA PLAN FRACTIONS TREATED TO DATE: 21
RAD ONC ARIA PLAN ID: NORMAL
RAD ONC ARIA PLAN PRESCRIBED DOSE PER FRACTION: 2.7 GY
RAD ONC ARIA PLAN PRIMARY REFERENCE POINT: NORMAL
RAD ONC ARIA PLAN TOTAL FRACTIONS PRESCRIBED: 28
RAD ONC ARIA PLAN TOTAL PRESCRIBED DOSE: 7560 CGY
RAD ONC ARIA REFERENCE POINT DOSAGE GIVEN TO DATE: 56.7 GY
RAD ONC ARIA REFERENCE POINT ID: NORMAL
RAD ONC ARIA REFERENCE POINT SESSION DOSAGE GIVEN: 2.7 GY

## 2025-08-21 PROCEDURE — 77385 HC NTSTY MODUL RAD TX DLVR SMPL: CPT

## 2025-08-21 PROCEDURE — 77336 RADIATION PHYSICS CONSULT: CPT

## 2025-08-22 ENCOUNTER — HOSPITAL ENCOUNTER (OUTPATIENT)
Dept: RADIATION ONCOLOGY | Age: 65
Setting detail: RECURRING SERIES
Discharge: HOME | End: 2025-08-25
Payer: COMMERCIAL

## 2025-08-22 LAB
RAD ONC ARIA COURSE FIRST TREATMENT DATE: NORMAL
RAD ONC ARIA COURSE ID: NORMAL
RAD ONC ARIA COURSE INTENT: NORMAL
RAD ONC ARIA COURSE LAST TREATMENT DATE: NORMAL
RAD ONC ARIA COURSE SESSION NUMBER: 22
RAD ONC ARIA COURSE START DATE: NORMAL
RAD ONC ARIA COURSE TREATMENT ELAPSED DAYS: 31
RAD ONC ARIA PLAN FRACTIONS TREATED TO DATE: 22
RAD ONC ARIA PLAN ID: NORMAL
RAD ONC ARIA PLAN PRESCRIBED DOSE PER FRACTION: 2.7 GY
RAD ONC ARIA PLAN PRIMARY REFERENCE POINT: NORMAL
RAD ONC ARIA PLAN TOTAL FRACTIONS PRESCRIBED: 28
RAD ONC ARIA PLAN TOTAL PRESCRIBED DOSE: 7560 CGY
RAD ONC ARIA REFERENCE POINT DOSAGE GIVEN TO DATE: 59.4 GY
RAD ONC ARIA REFERENCE POINT ID: NORMAL
RAD ONC ARIA REFERENCE POINT SESSION DOSAGE GIVEN: 2.7 GY

## 2025-08-22 PROCEDURE — 77385 HC NTSTY MODUL RAD TX DLVR SMPL: CPT

## 2025-08-25 ENCOUNTER — HOSPITAL ENCOUNTER (OUTPATIENT)
Dept: RADIATION ONCOLOGY | Age: 65
Setting detail: RECURRING SERIES
Discharge: HOME | End: 2025-08-28
Payer: COMMERCIAL

## 2025-08-25 LAB
RAD ONC ARIA COURSE FIRST TREATMENT DATE: NORMAL
RAD ONC ARIA COURSE ID: NORMAL
RAD ONC ARIA COURSE INTENT: NORMAL
RAD ONC ARIA COURSE LAST TREATMENT DATE: NORMAL
RAD ONC ARIA COURSE SESSION NUMBER: 23
RAD ONC ARIA COURSE START DATE: NORMAL
RAD ONC ARIA COURSE TREATMENT ELAPSED DAYS: 34
RAD ONC ARIA PLAN FRACTIONS TREATED TO DATE: 23
RAD ONC ARIA PLAN ID: NORMAL
RAD ONC ARIA PLAN PRESCRIBED DOSE PER FRACTION: 2.7 GY
RAD ONC ARIA PLAN PRIMARY REFERENCE POINT: NORMAL
RAD ONC ARIA PLAN TOTAL FRACTIONS PRESCRIBED: 28
RAD ONC ARIA PLAN TOTAL PRESCRIBED DOSE: 7560 CGY
RAD ONC ARIA REFERENCE POINT DOSAGE GIVEN TO DATE: 62.1 GY
RAD ONC ARIA REFERENCE POINT ID: NORMAL
RAD ONC ARIA REFERENCE POINT SESSION DOSAGE GIVEN: 2.7 GY

## 2025-08-25 PROCEDURE — 77385 HC NTSTY MODUL RAD TX DLVR SMPL: CPT

## 2025-08-26 ENCOUNTER — HOSPITAL ENCOUNTER (OUTPATIENT)
Dept: RADIATION ONCOLOGY | Age: 65
Setting detail: RECURRING SERIES
Discharge: HOME | End: 2025-08-29
Payer: COMMERCIAL

## 2025-08-26 LAB
RAD ONC ARIA COURSE FIRST TREATMENT DATE: NORMAL
RAD ONC ARIA COURSE ID: NORMAL
RAD ONC ARIA COURSE INTENT: NORMAL
RAD ONC ARIA COURSE LAST TREATMENT DATE: NORMAL
RAD ONC ARIA COURSE SESSION NUMBER: 24
RAD ONC ARIA COURSE START DATE: NORMAL
RAD ONC ARIA COURSE TREATMENT ELAPSED DAYS: 35
RAD ONC ARIA PLAN FRACTIONS TREATED TO DATE: 24
RAD ONC ARIA PLAN ID: NORMAL
RAD ONC ARIA PLAN PRESCRIBED DOSE PER FRACTION: 2.7 GY
RAD ONC ARIA PLAN PRIMARY REFERENCE POINT: NORMAL
RAD ONC ARIA PLAN TOTAL FRACTIONS PRESCRIBED: 28
RAD ONC ARIA PLAN TOTAL PRESCRIBED DOSE: 7560 CGY
RAD ONC ARIA REFERENCE POINT DOSAGE GIVEN TO DATE: 64.8 GY
RAD ONC ARIA REFERENCE POINT ID: NORMAL
RAD ONC ARIA REFERENCE POINT SESSION DOSAGE GIVEN: 2.7 GY

## 2025-08-26 PROCEDURE — 77385 HC NTSTY MODUL RAD TX DLVR SMPL: CPT

## 2025-08-27 ENCOUNTER — HOSPITAL ENCOUNTER (OUTPATIENT)
Dept: RADIATION ONCOLOGY | Age: 65
Setting detail: RECURRING SERIES
Discharge: HOME | End: 2025-08-30
Payer: COMMERCIAL

## 2025-08-27 LAB
RAD ONC ARIA COURSE FIRST TREATMENT DATE: NORMAL
RAD ONC ARIA COURSE ID: NORMAL
RAD ONC ARIA COURSE INTENT: NORMAL
RAD ONC ARIA COURSE LAST TREATMENT DATE: NORMAL
RAD ONC ARIA COURSE SESSION NUMBER: 25
RAD ONC ARIA COURSE START DATE: NORMAL
RAD ONC ARIA COURSE TREATMENT ELAPSED DAYS: 36
RAD ONC ARIA PLAN FRACTIONS TREATED TO DATE: 25
RAD ONC ARIA PLAN ID: NORMAL
RAD ONC ARIA PLAN PRESCRIBED DOSE PER FRACTION: 2.7 GY
RAD ONC ARIA PLAN PRIMARY REFERENCE POINT: NORMAL
RAD ONC ARIA PLAN TOTAL FRACTIONS PRESCRIBED: 28
RAD ONC ARIA PLAN TOTAL PRESCRIBED DOSE: 7560 CGY
RAD ONC ARIA REFERENCE POINT DOSAGE GIVEN TO DATE: 67.5 GY
RAD ONC ARIA REFERENCE POINT ID: NORMAL
RAD ONC ARIA REFERENCE POINT SESSION DOSAGE GIVEN: 2.7 GY

## 2025-08-27 PROCEDURE — 77385 HC NTSTY MODUL RAD TX DLVR SMPL: CPT

## 2025-08-27 PROCEDURE — 77336 RADIATION PHYSICS CONSULT: CPT

## 2025-08-28 ENCOUNTER — APPOINTMENT (OUTPATIENT)
Dept: RADIATION ONCOLOGY | Age: 65
End: 2025-08-28
Payer: COMMERCIAL

## 2025-08-28 ENCOUNTER — HOSPITAL ENCOUNTER (OUTPATIENT)
Dept: RADIATION ONCOLOGY | Age: 65
Setting detail: RECURRING SERIES
Discharge: HOME | End: 2025-08-31
Payer: COMMERCIAL

## 2025-08-28 LAB
RAD ONC ARIA COURSE FIRST TREATMENT DATE: NORMAL
RAD ONC ARIA COURSE ID: NORMAL
RAD ONC ARIA COURSE INTENT: NORMAL
RAD ONC ARIA COURSE LAST TREATMENT DATE: NORMAL
RAD ONC ARIA COURSE SESSION NUMBER: 26
RAD ONC ARIA COURSE START DATE: NORMAL
RAD ONC ARIA COURSE TREATMENT ELAPSED DAYS: 37
RAD ONC ARIA PLAN FRACTIONS TREATED TO DATE: 26
RAD ONC ARIA PLAN ID: NORMAL
RAD ONC ARIA PLAN PRESCRIBED DOSE PER FRACTION: 2.7 GY
RAD ONC ARIA PLAN PRIMARY REFERENCE POINT: NORMAL
RAD ONC ARIA PLAN TOTAL FRACTIONS PRESCRIBED: 28
RAD ONC ARIA PLAN TOTAL PRESCRIBED DOSE: 7560 CGY
RAD ONC ARIA REFERENCE POINT DOSAGE GIVEN TO DATE: 70.2 GY
RAD ONC ARIA REFERENCE POINT ID: NORMAL
RAD ONC ARIA REFERENCE POINT SESSION DOSAGE GIVEN: 2.7 GY

## 2025-08-28 PROCEDURE — 77385 HC NTSTY MODUL RAD TX DLVR SMPL: CPT

## 2025-08-29 ENCOUNTER — HOSPITAL ENCOUNTER (OUTPATIENT)
Dept: RADIATION ONCOLOGY | Age: 65
Setting detail: RECURRING SERIES
Discharge: HOME | End: 2025-09-01
Payer: COMMERCIAL

## 2025-08-29 ENCOUNTER — APPOINTMENT (OUTPATIENT)
Dept: RADIATION ONCOLOGY | Age: 65
End: 2025-08-29
Payer: COMMERCIAL

## 2025-08-29 LAB
RAD ONC ARIA COURSE FIRST TREATMENT DATE: NORMAL
RAD ONC ARIA COURSE ID: NORMAL
RAD ONC ARIA COURSE INTENT: NORMAL
RAD ONC ARIA COURSE LAST TREATMENT DATE: NORMAL
RAD ONC ARIA COURSE SESSION NUMBER: 27
RAD ONC ARIA COURSE START DATE: NORMAL
RAD ONC ARIA COURSE TREATMENT ELAPSED DAYS: 38
RAD ONC ARIA PLAN FRACTIONS TREATED TO DATE: 27
RAD ONC ARIA PLAN ID: NORMAL
RAD ONC ARIA PLAN PRESCRIBED DOSE PER FRACTION: 2.7 GY
RAD ONC ARIA PLAN PRIMARY REFERENCE POINT: NORMAL
RAD ONC ARIA PLAN TOTAL FRACTIONS PRESCRIBED: 28
RAD ONC ARIA PLAN TOTAL PRESCRIBED DOSE: 7560 CGY
RAD ONC ARIA REFERENCE POINT DOSAGE GIVEN TO DATE: 72.9 GY
RAD ONC ARIA REFERENCE POINT ID: NORMAL
RAD ONC ARIA REFERENCE POINT SESSION DOSAGE GIVEN: 2.7 GY

## 2025-08-29 PROCEDURE — 77385 HC NTSTY MODUL RAD TX DLVR SMPL: CPT

## 2025-09-02 ENCOUNTER — HOSPITAL ENCOUNTER (OUTPATIENT)
Dept: RADIATION ONCOLOGY | Age: 65
Setting detail: RECURRING SERIES
Discharge: HOME | End: 2025-09-05
Payer: COMMERCIAL

## 2025-09-02 ENCOUNTER — HOSPITAL ENCOUNTER (OUTPATIENT)
Dept: LAB | Age: 65
Discharge: HOME | End: 2025-09-02
Payer: COMMERCIAL

## 2025-09-02 DIAGNOSIS — C79.51 METASTASIS TO BONE (HCC): ICD-10-CM

## 2025-09-02 DIAGNOSIS — C61 PROSTATE CANCER (HCC): ICD-10-CM

## 2025-09-02 DIAGNOSIS — C61 PROSTATE CANCER (HCC): Primary | ICD-10-CM

## 2025-09-02 LAB
ALBUMIN SERPL-MCNC: 3.8 G/DL (ref 3.2–4.6)
ALBUMIN/GLOB SERPL: 1.3 (ref 1–1.9)
ALP SERPL-CCNC: 49 U/L (ref 40–129)
ALT SERPL-CCNC: 13 U/L (ref 8–55)
ANION GAP SERPL CALC-SCNC: 14 MMOL/L (ref 7–16)
AST SERPL-CCNC: 15 U/L (ref 15–37)
BASOPHILS # BLD: 0.01 K/UL (ref 0–0.2)
BASOPHILS NFR BLD: 0.4 % (ref 0–2)
BILIRUB SERPL-MCNC: 0.3 MG/DL (ref 0–1.2)
BUN SERPL-MCNC: 32 MG/DL (ref 8–23)
CALCIUM SERPL-MCNC: 9.4 MG/DL (ref 8.8–10.2)
CHLORIDE SERPL-SCNC: 106 MMOL/L (ref 98–107)
CO2 SERPL-SCNC: 21 MMOL/L (ref 20–29)
CREAT SERPL-MCNC: 3.14 MG/DL (ref 0.8–1.3)
DIFFERENTIAL METHOD BLD: ABNORMAL
EOSINOPHIL # BLD: 0.07 K/UL (ref 0–0.8)
EOSINOPHIL NFR BLD: 2.7 % (ref 0.5–7.8)
ERYTHROCYTE [DISTWIDTH] IN BLOOD BY AUTOMATED COUNT: 15.2 % (ref 11.9–14.6)
GLOBULIN SER CALC-MCNC: 3 G/DL (ref 2.3–3.5)
GLUCOSE SERPL-MCNC: 143 MG/DL (ref 70–99)
HCT VFR BLD AUTO: 33.5 % (ref 41.1–50.3)
HGB BLD-MCNC: 10.7 G/DL (ref 13.6–17.2)
IMM GRANULOCYTES # BLD AUTO: 0.06 K/UL (ref 0–0.5)
IMM GRANULOCYTES NFR BLD AUTO: 2.3 % (ref 0–5)
LYMPHOCYTES # BLD: 0.18 K/UL (ref 0.5–4.6)
LYMPHOCYTES NFR BLD: 6.9 % (ref 13–44)
MCH RBC QN AUTO: 26.4 PG (ref 26.1–32.9)
MCHC RBC AUTO-ENTMCNC: 31.9 G/DL (ref 31.4–35)
MCV RBC AUTO: 82.7 FL (ref 82–102)
MONOCYTES # BLD: 0.35 K/UL (ref 0.1–1.3)
MONOCYTES NFR BLD: 13.4 % (ref 4–12)
NEUTS SEG # BLD: 1.95 K/UL (ref 1.7–8.2)
NEUTS SEG NFR BLD: 74.3 % (ref 43–78)
NRBC # BLD: 0 K/UL (ref 0–0.2)
PLATELET # BLD AUTO: 141 K/UL (ref 150–450)
PMV BLD AUTO: 9.6 FL (ref 9.4–12.3)
POTASSIUM SERPL-SCNC: 3.9 MMOL/L (ref 3.5–5.1)
PROT SERPL-MCNC: 6.8 G/DL (ref 6.3–8.2)
PSA SERPL-MCNC: 0.3 NG/ML (ref 0–4)
RAD ONC ARIA COURSE FIRST TREATMENT DATE: NORMAL
RAD ONC ARIA COURSE ID: NORMAL
RAD ONC ARIA COURSE INTENT: NORMAL
RAD ONC ARIA COURSE LAST TREATMENT DATE: NORMAL
RAD ONC ARIA COURSE SESSION NUMBER: 28
RAD ONC ARIA COURSE START DATE: NORMAL
RAD ONC ARIA COURSE TREATMENT ELAPSED DAYS: 42
RAD ONC ARIA PLAN FRACTIONS TREATED TO DATE: 28
RAD ONC ARIA PLAN ID: NORMAL
RAD ONC ARIA PLAN PRESCRIBED DOSE PER FRACTION: 2.7 GY
RAD ONC ARIA PLAN PRIMARY REFERENCE POINT: NORMAL
RAD ONC ARIA PLAN TOTAL FRACTIONS PRESCRIBED: 28
RAD ONC ARIA PLAN TOTAL PRESCRIBED DOSE: 7560 CGY
RAD ONC ARIA REFERENCE POINT DOSAGE GIVEN TO DATE: 75.6 GY
RAD ONC ARIA REFERENCE POINT ID: NORMAL
RAD ONC ARIA REFERENCE POINT SESSION DOSAGE GIVEN: 2.7 GY
RBC # BLD AUTO: 4.05 M/UL (ref 4.23–5.6)
SODIUM SERPL-SCNC: 141 MMOL/L (ref 136–145)
WBC # BLD AUTO: 2.6 K/UL (ref 4.3–11.1)

## 2025-09-02 PROCEDURE — 77334 RADIATION TREATMENT AID(S): CPT

## 2025-09-02 PROCEDURE — 80053 COMPREHEN METABOLIC PANEL: CPT

## 2025-09-02 PROCEDURE — 77336 RADIATION PHYSICS CONSULT: CPT

## 2025-09-02 PROCEDURE — 36415 COLL VENOUS BLD VENIPUNCTURE: CPT

## 2025-09-02 PROCEDURE — 84153 ASSAY OF PSA TOTAL: CPT

## 2025-09-02 PROCEDURE — 85025 COMPLETE CBC W/AUTO DIFF WBC: CPT

## 2025-09-02 PROCEDURE — 77385 HC NTSTY MODUL RAD TX DLVR SMPL: CPT

## (undated) DEVICE — STERILE PACKED. BORE DIAMETER 1.6 MM; ANGLE OF INSERTION 19° TO THE LONG AXIS OF THE TRANSDUCER: Brand: SINGLE-USE BIPLANE BIOPSY GUIDE

## (undated) DEVICE — MAX-CORE® DISPOSABLE CORE BIOPSY INSTRUMENT, 18G X 25CM: Brand: MAX-CORE

## (undated) DEVICE — SOLUTION IRRIG 1000ML H2O PIC PLAS SHATTERPROOF CONTAINER

## (undated) DEVICE — JELLY,LUBE,STERILE,FLIP TOP,TUBE,4-OZ: Brand: MEDLINE

## (undated) DEVICE — GLOVE SURG SZ 8 CRM LTX FREE POLYISOPRENE POLYMER BEAD ANTI

## (undated) DEVICE — COVER PRB W2.6XL30CM FOR BK3000 BK5000 NEOGUARD

## (undated) DEVICE — TOWEL,OR,DSP,ST,NATURAL,DLX,4/PK,20PK/CS: Brand: MEDLINE

## (undated) DEVICE — HOLDER PRB URONAV

## (undated) DEVICE — KIT BX PROST 20ML VI PREFIL W 10ML 10% NEUT BUFF FRMLN LEAK

## (undated) DEVICE — GEL US 20GM NONIRRITATING OVERWRAPPED FILE PCH TRNSMIT